# Patient Record
Sex: FEMALE | Race: WHITE | NOT HISPANIC OR LATINO | Employment: FULL TIME | URBAN - METROPOLITAN AREA
[De-identification: names, ages, dates, MRNs, and addresses within clinical notes are randomized per-mention and may not be internally consistent; named-entity substitution may affect disease eponyms.]

---

## 2022-10-03 ENCOUNTER — HOSPITAL ENCOUNTER (OUTPATIENT)
Facility: HOSPITAL | Age: 37
Discharge: HOME | DRG: 520 | End: 2022-10-06
Attending: STUDENT IN AN ORGANIZED HEALTH CARE EDUCATION/TRAINING PROGRAM | Admitting: INTERNAL MEDICINE
Payer: COMMERCIAL

## 2022-10-03 DIAGNOSIS — Z01.818 PREOP TESTING: ICD-10-CM

## 2022-10-03 DIAGNOSIS — M54.50 LUMBAR PAIN: ICD-10-CM

## 2022-10-03 DIAGNOSIS — M54.32 SCIATICA OF LEFT SIDE: Primary | ICD-10-CM

## 2022-10-03 DIAGNOSIS — R52 INTRACTABLE PAIN: ICD-10-CM

## 2022-10-03 DIAGNOSIS — M51.26 LUMBAR DISC HERNIATION: ICD-10-CM

## 2022-10-03 DIAGNOSIS — N83.209 CYST OF OVARY, UNSPECIFIED LATERALITY: ICD-10-CM

## 2022-10-03 PROCEDURE — 99285 EMERGENCY DEPT VISIT HI MDM: CPT | Mod: 25

## 2022-10-03 PROCEDURE — 96376 TX/PRO/DX INJ SAME DRUG ADON: CPT

## 2022-10-03 PROCEDURE — 96374 THER/PROPH/DIAG INJ IV PUSH: CPT

## 2022-10-03 PROCEDURE — 96375 TX/PRO/DX INJ NEW DRUG ADDON: CPT

## 2022-10-03 NOTE — Clinical Note
Diagnosis: Intractable pain [509763]   Admitting Provider:: СВЕТЛАНА BETH [[id number]]   Future Attending Provider: СВЕТЛАНА BETH [[id number]]   Reason for IP Medical Treatment  (Clinical interventions that can only be accomplished in the IP setting? ) :: Intractable Lumbar pain, hx of steroid injections, sciatica,   Estimated Length of Stay:: 2 midnights   I certify that Inpatient services for greater than or equal to 2 midnights are medically necessary:: Yes   Plans for Post-Acute care--if anticipated (pick the single best option):: A. No post acute care anticipated at this time

## 2022-10-04 LAB
ALBUMIN SERPL-MCNC: 4.1 GM/DL (ref 3.5–5)
ALBUMIN/GLOB SERPL: 1.4 RATIO (ref 1.1–2)
ALP SERPL-CCNC: 46 UNIT/L (ref 40–150)
ALT SERPL-CCNC: 13 UNIT/L (ref 0–55)
APTT PPP: 27.3 SECONDS (ref 23.2–33.7)
AST SERPL-CCNC: 15 UNIT/L (ref 5–34)
BASOPHILS # BLD AUTO: 0.05 X10(3)/MCL (ref 0–0.2)
BASOPHILS NFR BLD AUTO: 0.5 %
BILIRUBIN DIRECT+TOT PNL SERPL-MCNC: 0.3 MG/DL
BUN SERPL-MCNC: 15 MG/DL (ref 7–18.7)
CALCIUM SERPL-MCNC: 9.5 MG/DL (ref 8.4–10.2)
CHLORIDE SERPL-SCNC: 108 MMOL/L (ref 98–107)
CO2 SERPL-SCNC: 26 MMOL/L (ref 22–29)
CREAT SERPL-MCNC: 0.7 MG/DL (ref 0.55–1.02)
EOSINOPHIL # BLD AUTO: 0.2 X10(3)/MCL (ref 0–0.9)
EOSINOPHIL NFR BLD AUTO: 2 %
ERYTHROCYTE [DISTWIDTH] IN BLOOD BY AUTOMATED COUNT: 14.2 % (ref 11.5–17)
GFR SERPLBLD CREATININE-BSD FMLA CKD-EPI: >60 MLS/MIN/1.73/M2
GLOBULIN SER-MCNC: 2.9 GM/DL (ref 2.4–3.5)
GLUCOSE SERPL-MCNC: 67 MG/DL (ref 74–100)
GROUP & RH: NORMAL
HCT VFR BLD AUTO: 37.8 % (ref 37–47)
HGB BLD-MCNC: 12 GM/DL (ref 12–16)
IMM GRANULOCYTES # BLD AUTO: 0.02 X10(3)/MCL (ref 0–0.04)
IMM GRANULOCYTES NFR BLD AUTO: 0.2 %
INDIRECT COOMBS GEL: NORMAL
INR BLD: 0.97 (ref 0–1.3)
LYMPHOCYTES # BLD AUTO: 3.36 X10(3)/MCL (ref 0.6–4.6)
LYMPHOCYTES NFR BLD AUTO: 34.2 %
MCH RBC QN AUTO: 27.3 PG (ref 27–31)
MCHC RBC AUTO-ENTMCNC: 31.7 MG/DL (ref 33–36)
MCV RBC AUTO: 85.9 FL (ref 80–94)
MONOCYTES # BLD AUTO: 0.94 X10(3)/MCL (ref 0.1–1.3)
MONOCYTES NFR BLD AUTO: 9.6 %
NEUTROPHILS # BLD AUTO: 5.3 X10(3)/MCL (ref 2.1–9.2)
NEUTROPHILS NFR BLD AUTO: 53.5 %
NRBC BLD AUTO-RTO: 0 %
PLATELET # BLD AUTO: 350 X10(3)/MCL (ref 130–400)
PMV BLD AUTO: 10.4 FL (ref 7.4–10.4)
POTASSIUM SERPL-SCNC: 3.8 MMOL/L (ref 3.5–5.1)
PROT SERPL-MCNC: 7 GM/DL (ref 6.4–8.3)
PROTHROMBIN TIME: 12.8 SECONDS (ref 12.5–14.5)
RBC # BLD AUTO: 4.4 X10(6)/MCL (ref 4.2–5.4)
SODIUM SERPL-SCNC: 139 MMOL/L (ref 136–145)
WBC # SPEC AUTO: 9.8 X10(3)/MCL (ref 4.5–11.5)

## 2022-10-04 PROCEDURE — 21400001 HC TELEMETRY ROOM

## 2022-10-04 PROCEDURE — 85730 THROMBOPLASTIN TIME PARTIAL: CPT | Performed by: NURSE PRACTITIONER

## 2022-10-04 PROCEDURE — 36415 COLL VENOUS BLD VENIPUNCTURE: CPT | Performed by: NURSE PRACTITIONER

## 2022-10-04 PROCEDURE — 25000003 PHARM REV CODE 250: Performed by: INTERNAL MEDICINE

## 2022-10-04 PROCEDURE — G0378 HOSPITAL OBSERVATION PER HR: HCPCS

## 2022-10-04 PROCEDURE — 85025 COMPLETE CBC W/AUTO DIFF WBC: CPT | Performed by: STUDENT IN AN ORGANIZED HEALTH CARE EDUCATION/TRAINING PROGRAM

## 2022-10-04 PROCEDURE — 63600175 PHARM REV CODE 636 W HCPCS: Performed by: INTERNAL MEDICINE

## 2022-10-04 PROCEDURE — 96376 TX/PRO/DX INJ SAME DRUG ADON: CPT

## 2022-10-04 PROCEDURE — 25500020 PHARM REV CODE 255: Performed by: STUDENT IN AN ORGANIZED HEALTH CARE EDUCATION/TRAINING PROGRAM

## 2022-10-04 PROCEDURE — 63600175 PHARM REV CODE 636 W HCPCS

## 2022-10-04 PROCEDURE — 87635 SARS-COV-2 COVID-19 AMP PRB: CPT | Performed by: NEUROLOGICAL SURGERY

## 2022-10-04 PROCEDURE — 85610 PROTHROMBIN TIME: CPT | Performed by: NURSE PRACTITIONER

## 2022-10-04 PROCEDURE — 63600175 PHARM REV CODE 636 W HCPCS: Performed by: STUDENT IN AN ORGANIZED HEALTH CARE EDUCATION/TRAINING PROGRAM

## 2022-10-04 PROCEDURE — 80053 COMPREHEN METABOLIC PANEL: CPT | Performed by: STUDENT IN AN ORGANIZED HEALTH CARE EDUCATION/TRAINING PROGRAM

## 2022-10-04 PROCEDURE — 86900 BLOOD TYPING SEROLOGIC ABO: CPT

## 2022-10-04 PROCEDURE — 36415 COLL VENOUS BLD VENIPUNCTURE: CPT | Performed by: STUDENT IN AN ORGANIZED HEALTH CARE EDUCATION/TRAINING PROGRAM

## 2022-10-04 PROCEDURE — 96375 TX/PRO/DX INJ NEW DRUG ADDON: CPT

## 2022-10-04 PROCEDURE — 96374 THER/PROPH/DIAG INJ IV PUSH: CPT

## 2022-10-04 RX ORDER — KETOROLAC TROMETHAMINE 30 MG/ML
30 INJECTION, SOLUTION INTRAMUSCULAR; INTRAVENOUS ONCE
Status: COMPLETED | OUTPATIENT
Start: 2022-10-04 | End: 2022-10-04

## 2022-10-04 RX ORDER — HYDROMORPHONE HYDROCHLORIDE 2 MG/ML
1 INJECTION, SOLUTION INTRAMUSCULAR; INTRAVENOUS; SUBCUTANEOUS
Status: COMPLETED | OUTPATIENT
Start: 2022-10-04 | End: 2022-10-04

## 2022-10-04 RX ORDER — GLUCAGON 1 MG
1 KIT INJECTION
Status: DISCONTINUED | OUTPATIENT
Start: 2022-10-04 | End: 2022-10-06 | Stop reason: HOSPADM

## 2022-10-04 RX ORDER — MORPHINE SULFATE 4 MG/ML
4 INJECTION, SOLUTION INTRAMUSCULAR; INTRAVENOUS
Status: COMPLETED | OUTPATIENT
Start: 2022-10-04 | End: 2022-10-04

## 2022-10-04 RX ORDER — MORPHINE SULFATE 4 MG/ML
4 INJECTION, SOLUTION INTRAMUSCULAR; INTRAVENOUS
Status: DISCONTINUED | OUTPATIENT
Start: 2022-10-04 | End: 2022-10-04

## 2022-10-04 RX ORDER — BUPROPION HYDROCHLORIDE 150 MG/1
150 TABLET ORAL DAILY
COMMUNITY
Start: 2022-09-26

## 2022-10-04 RX ORDER — LIDOCAINE 50 MG/G
1 PATCH TOPICAL
Status: DISCONTINUED | OUTPATIENT
Start: 2022-10-04 | End: 2022-10-06 | Stop reason: HOSPADM

## 2022-10-04 RX ORDER — IBUPROFEN 200 MG
16 TABLET ORAL
Status: DISCONTINUED | OUTPATIENT
Start: 2022-10-04 | End: 2022-10-06 | Stop reason: HOSPADM

## 2022-10-04 RX ORDER — FAMOTIDINE 20 MG/1
20 TABLET, FILM COATED ORAL 2 TIMES DAILY
Status: DISCONTINUED | OUTPATIENT
Start: 2022-10-04 | End: 2022-10-06 | Stop reason: HOSPADM

## 2022-10-04 RX ORDER — OXYCODONE AND ACETAMINOPHEN 10; 325 MG/1; MG/1
1 TABLET ORAL EVERY 4 HOURS PRN
Status: DISCONTINUED | OUTPATIENT
Start: 2022-10-04 | End: 2022-10-06 | Stop reason: HOSPADM

## 2022-10-04 RX ORDER — GABAPENTIN 300 MG/1
300 CAPSULE ORAL 3 TIMES DAILY
Status: DISCONTINUED | OUTPATIENT
Start: 2022-10-04 | End: 2022-10-06 | Stop reason: HOSPADM

## 2022-10-04 RX ORDER — CYCLOBENZAPRINE HCL 10 MG
10 TABLET ORAL 3 TIMES DAILY
Status: DISCONTINUED | OUTPATIENT
Start: 2022-10-04 | End: 2022-10-06 | Stop reason: HOSPADM

## 2022-10-04 RX ORDER — ONDANSETRON 2 MG/ML
4 INJECTION INTRAMUSCULAR; INTRAVENOUS EVERY 8 HOURS PRN
Status: DISCONTINUED | OUTPATIENT
Start: 2022-10-04 | End: 2022-10-06 | Stop reason: HOSPADM

## 2022-10-04 RX ORDER — DROSPIRENONE 4 MG/1
1 TABLET, FILM COATED ORAL DAILY
COMMUNITY
Start: 2022-07-29

## 2022-10-04 RX ORDER — ONDANSETRON 2 MG/ML
INJECTION INTRAMUSCULAR; INTRAVENOUS
Status: COMPLETED
Start: 2022-10-04 | End: 2022-10-04

## 2022-10-04 RX ORDER — ONDANSETRON 2 MG/ML
4 INJECTION INTRAMUSCULAR; INTRAVENOUS
Status: COMPLETED | OUTPATIENT
Start: 2022-10-04 | End: 2022-10-04

## 2022-10-04 RX ORDER — DROPERIDOL 2.5 MG/ML
0.62 INJECTION, SOLUTION INTRAMUSCULAR; INTRAVENOUS
Status: COMPLETED | OUTPATIENT
Start: 2022-10-04 | End: 2022-10-04

## 2022-10-04 RX ORDER — CYCLOBENZAPRINE HCL 10 MG
10 TABLET ORAL NIGHTLY PRN
COMMUNITY
Start: 2022-10-03

## 2022-10-04 RX ORDER — HYDROMORPHONE HYDROCHLORIDE 2 MG/ML
1 INJECTION, SOLUTION INTRAMUSCULAR; INTRAVENOUS; SUBCUTANEOUS EVERY 4 HOURS PRN
Status: DISCONTINUED | OUTPATIENT
Start: 2022-10-04 | End: 2022-10-06 | Stop reason: HOSPADM

## 2022-10-04 RX ORDER — MORPHINE SULFATE 4 MG/ML
INJECTION, SOLUTION INTRAMUSCULAR; INTRAVENOUS
Status: COMPLETED
Start: 2022-10-04 | End: 2022-10-04

## 2022-10-04 RX ORDER — DEXAMETHASONE SODIUM PHOSPHATE 4 MG/ML
12 INJECTION, SOLUTION INTRA-ARTICULAR; INTRALESIONAL; INTRAMUSCULAR; INTRAVENOUS; SOFT TISSUE ONCE
Status: COMPLETED | OUTPATIENT
Start: 2022-10-04 | End: 2022-10-04

## 2022-10-04 RX ORDER — PANTOPRAZOLE SODIUM 40 MG/1
40 TABLET, DELAYED RELEASE ORAL DAILY
COMMUNITY
Start: 2022-09-26

## 2022-10-04 RX ORDER — KETOROLAC TROMETHAMINE 30 MG/ML
15 INJECTION, SOLUTION INTRAMUSCULAR; INTRAVENOUS EVERY 6 HOURS PRN
Status: DISCONTINUED | OUTPATIENT
Start: 2022-10-04 | End: 2022-10-06 | Stop reason: HOSPADM

## 2022-10-04 RX ORDER — ACETAMINOPHEN 325 MG/1
650 TABLET ORAL EVERY 4 HOURS PRN
Status: DISCONTINUED | OUTPATIENT
Start: 2022-10-04 | End: 2022-10-06 | Stop reason: HOSPADM

## 2022-10-04 RX ORDER — HYDROXYCHLOROQUINE SULFATE 200 MG/1
400 TABLET, FILM COATED ORAL DAILY
COMMUNITY
Start: 2022-09-24

## 2022-10-04 RX ORDER — ACETAMINOPHEN 325 MG/1
650 TABLET ORAL EVERY 8 HOURS PRN
Status: DISCONTINUED | OUTPATIENT
Start: 2022-10-04 | End: 2022-10-06 | Stop reason: HOSPADM

## 2022-10-04 RX ORDER — IBUPROFEN 200 MG
24 TABLET ORAL
Status: DISCONTINUED | OUTPATIENT
Start: 2022-10-04 | End: 2022-10-06 | Stop reason: HOSPADM

## 2022-10-04 RX ADMIN — MORPHINE SULFATE 4 MG: 4 INJECTION INTRAVENOUS at 02:10

## 2022-10-04 RX ADMIN — HYDROMORPHONE HYDROCHLORIDE 1 MG: 2 INJECTION, SOLUTION INTRAMUSCULAR; INTRAVENOUS; SUBCUTANEOUS at 06:10

## 2022-10-04 RX ADMIN — KETOROLAC TROMETHAMINE 30 MG: 30 INJECTION, SOLUTION INTRAMUSCULAR; INTRAVENOUS at 11:10

## 2022-10-04 RX ADMIN — CYCLOBENZAPRINE 10 MG: 10 TABLET, FILM COATED ORAL at 11:10

## 2022-10-04 RX ADMIN — GABAPENTIN 300 MG: 300 CAPSULE ORAL at 08:10

## 2022-10-04 RX ADMIN — LIDOCAINE 1 PATCH: 50 PATCH TOPICAL at 02:10

## 2022-10-04 RX ADMIN — GABAPENTIN 300 MG: 300 CAPSULE ORAL at 02:10

## 2022-10-04 RX ADMIN — ONDANSETRON 4 MG: 2 INJECTION INTRAMUSCULAR; INTRAVENOUS at 02:10

## 2022-10-04 RX ADMIN — HYDROMORPHONE HYDROCHLORIDE 1 MG: 2 INJECTION, SOLUTION INTRAMUSCULAR; INTRAVENOUS; SUBCUTANEOUS at 03:10

## 2022-10-04 RX ADMIN — OXYCODONE AND ACETAMINOPHEN 1 TABLET: 10; 325 TABLET ORAL at 07:10

## 2022-10-04 RX ADMIN — ONDANSETRON 4 MG: 2 INJECTION INTRAMUSCULAR; INTRAVENOUS at 03:10

## 2022-10-04 RX ADMIN — DEXAMETHASONE SODIUM PHOSPHATE 12 MG: 4 INJECTION, SOLUTION INTRA-ARTICULAR; INTRALESIONAL; INTRAMUSCULAR; INTRAVENOUS; SOFT TISSUE at 11:10

## 2022-10-04 RX ADMIN — DROPERIDOL 0.62 MG: 2.5 INJECTION, SOLUTION INTRAMUSCULAR; INTRAVENOUS at 04:10

## 2022-10-04 RX ADMIN — HYDROMORPHONE HYDROCHLORIDE 1 MG: 2 INJECTION INTRAMUSCULAR; INTRAVENOUS; SUBCUTANEOUS at 08:10

## 2022-10-04 RX ADMIN — HYDROMORPHONE HYDROCHLORIDE 1 MG: 2 INJECTION INTRAMUSCULAR; INTRAVENOUS; SUBCUTANEOUS at 03:10

## 2022-10-04 RX ADMIN — FAMOTIDINE 20 MG: 20 TABLET, FILM COATED ORAL at 11:10

## 2022-10-04 RX ADMIN — MORPHINE SULFATE 4 MG: 4 INJECTION, SOLUTION INTRAMUSCULAR; INTRAVENOUS at 02:10

## 2022-10-04 RX ADMIN — OXYCODONE AND ACETAMINOPHEN 1 TABLET: 10; 325 TABLET ORAL at 02:10

## 2022-10-04 RX ADMIN — KETOROLAC TROMETHAMINE 15 MG: 30 INJECTION, SOLUTION INTRAMUSCULAR at 10:10

## 2022-10-04 RX ADMIN — FAMOTIDINE 20 MG: 20 TABLET, FILM COATED ORAL at 08:10

## 2022-10-04 RX ADMIN — HYDROMORPHONE HYDROCHLORIDE 1 MG: 2 INJECTION INTRAMUSCULAR; INTRAVENOUS; SUBCUTANEOUS at 11:10

## 2022-10-04 RX ADMIN — CYCLOBENZAPRINE 10 MG: 10 TABLET, FILM COATED ORAL at 02:10

## 2022-10-04 RX ADMIN — IOPAMIDOL 100 ML: 755 INJECTION, SOLUTION INTRAVENOUS at 04:10

## 2022-10-04 RX ADMIN — CYCLOBENZAPRINE 10 MG: 10 TABLET, FILM COATED ORAL at 08:10

## 2022-10-04 NOTE — H&P
[facility]   H&P ADMISSION NOTE      Patient Name: Vonnie Batista  MRN: [id number]  Patient Class: IP- Inpatient   Admission Date: 10/3/2022   Admitting Physician:  Service   Attending Physician: Miko Ozuna MD  Primary Care Provider: No primary care provider on file.  Face-to-Face encounter date: 10/04/2022        CHIEF COMPLAINT     Chief Complaint   Patient presents with    Leg Pain     R leg pain since Saturday, hx sciatic pain same area w/ steroid injection 2 weeks ago, norco not helping pain, scheduled for MRI - pt of dr ledezma       HISTORY OF PRESENTING ILLNESS   36-year-old female with a past medical history of chronic lower back pain.  She presented to the emergency department today with complaints of intractable lower back pain and left leg heaviness.  She states that it is radiating from the left posterior buttock all the way down to the toes, denies any loss of sensation or weakness, no urinary or bowel incontinence.  He she stated that she has had back pain for many years but recently worsened and she received a lumbar epidural steroid injection recently which provided her about 2 weeks of relief but pain returned again on Saturday 4 days ago.  Neurosurgery has been consulted and we have been asked to admit the patient for further workup.        PAST MEDICAL HISTORY   History reviewed. No pertinent past medical history.  Chronic lower back pain    PAST SURGICAL HISTORY   History reviewed. No pertinent surgical history.  LESI    FAMILY HISTORY   Reviewed and noncontributory to this case    SOCIAL HISTORY     Social History     Socioeconomic History    Marital status:        HOME MEDICATIONS     Prior to Admission medications    Not on File       ALLERGIES   Patient has no known allergies.          REVIEW OF SYSTEMS   Except as documented above, all other systems reviewed and negative    PHYSICAL EXAM     Vitals:    10/04/22 1107   BP:    Pulse:     Resp: 20   Temp:       General:  In no acute distress, resting comfortably  Head and neck:  Atraumatic, normocephalic, moist mucous membranes, supple neck  Chest:  Clear to auscultation bilaterally  Heart:  S1, S2, no appreciable murmur  Abdomen:  Soft, nontender, BS +  MSK:  Warm, no lower extremity edema, no clubbing or cyanosis  Neuro:  Alert and oriented x4, moving all extremities with good strength, weak dorsiflexion on the left  Integumentary:  No obvious skin rash  Psychiatry:  Appropriate mood and affect          ASSESSMENT AND PLAN   Intractable lower back pain     History of: Chronic lower back pain/sciatica      Neurosurgery consulted.    Pending MRI lumbar spine  Pain control.  Give 1 dose IV dexamethasone, Toradol and oral cyclobenzaprine 10 t.i.d., gabapentin 300 t.i.d..  PT and OT    DVT prophylaxis:    __________________________________________________________________  LABS/MICRO/MEDS/DIAGNOSTICS       LABS  Recent Labs     10/04/22  0300      K 3.8   CHLORIDE 108*   CO2 26   BUN 15.0   CREATININE 0.70   GLUCOSE 67*   CALCIUM 9.5   ALKPHOS 46   AST 15   ALT 13   ALBUMIN 4.1     Recent Labs     10/04/22  0300   WBC 9.8   RBC 4.40   HCT 37.8   MCV 85.9          MICROBIOLOGY  Microbiology Results (last 7 days)       ** No results found for the last 168 hours. **            MEDICATIONS   cyclobenzaprine  10 mg Oral TID    famotidine  20 mg Oral BID    gabapentin  300 mg Oral TID      INFUSIONS      DIAGNOSTIC TESTS  CT Abdomen Pelvis With Contrast   Final Result      1. No acute abdominopelvic findings.   2. Possible disc protrusion or extrusion L4-L5.   3.  No significant discrepancy with the preliminary report.         Electronically signed by: Nishant Flores   Date:    10/04/2022   Time:    07:31      MRI Lumbar Spine Without Contrast    (Results Pending)          Patient information was obtained from patient, patient's family, past medical records and ER records.   All diagnosis and  differential diagnosis have been reviewed; assessment and plan has been documented. I have personally reviewed the labs and test results that are presently available; I have reviewed the patients medication list. I have reviewed the consulting providers response and recommendations. I have reviewed or attempted to review medical records based upon their availability.  All of the patient's questions have been addressed and answered. Patient's is agreeable to the above stated plan. I will continue to monitor closely and make adjustments to medical management as needed.  This document was created using M*Modal Fluency Direct.  Transcription errors may have been made.  Please contact me if any questions may rise regarding documentation to clarify verbiage.        Miko Ozuna MD   10/04/2022   Internal Medicine

## 2022-10-04 NOTE — CONSULTS
[facility] - Emergency Dept  Neurosurgery  Consult Note    Consults  Subjective:     Chief Complaint/Reason for Admission:  Intractable lower extremity pain on the left.    History of Present Illness:  This is a pleasant 36-year-old  female with past medical history significant for degenerative disc disease, on Plaquenil for autoimmune disease, recent steroid injection LESI 2 weeks ago, history of sciatic pain presents with intractable pain, tingling, numbness of left lower extremity.  Denies any incontinence or bowel issues.  She does report left leg weakness.  She was scheduled for an MRI on Wednesday and possible surgical evaluation with Dr. Grace to follow.    On exam she is pleasant oriented to all spheres, moves all extremities.  Endorses left lower extremity weakness.  Numbness tingling burning, extreme pain to left lower extremity that starts at the hip and goes down to her left ankle which she says is throbbing.  Upper extremities 5/5.  Right lower extremity 5/5.  Left lower extremity dorsiflex 1/5.  Plantar flexor 4/5.  Positive SLR on the left lower extremity.  She stated she was still able to ambulate but has been having this drop foot for awhile.  Unknown time frame.    CT performed with:  Broad-based left central and subarticular disc bulge, possibly protrusion at L4-L5.  MRI recommended.      Patient is being sedated and will have MRI lumbar spine soon.    (Not in a hospital admission)      Review of patient's allergies indicates:  No Known Allergies    History reviewed. No pertinent past medical history.  History reviewed. No pertinent surgical history.  Family History    None       Tobacco Use    Smoking status: Not on file    Smokeless tobacco: Not on file   Substance and Sexual Activity    Alcohol use: Not on file    Drug use: Not on file    Sexual activity: Not on file     Review of Systems   Musculoskeletal:         Extreme left lower leg pain, numbness, tingling,  "burning all the way down to the ankle which is throbbing.   Objective:        There is no height or weight on file to calculate BMI.  Vital Signs (Most Recent):  Temp: 98.2 °F (36.8 °C) (10/03/22 2052)  Pulse: 74 (10/04/22 0941)  Resp: 20 (10/04/22 1107)  BP: (!) 144/83 (10/04/22 0941)  SpO2: 99 % (10/04/22 0941)   Vital Signs (24h Range):  Temp:  [98.2 °F (36.8 °C)] 98.2 °F (36.8 °C)  Pulse:  [64-83] 74  Resp:  [16-20] 20  SpO2:  [94 %-100 %] 99 %  BP: (108-144)/(64-83) 144/83                          Physical Exam:  Nursing note and vitals reviewed.    Constitutional: She appears well-developed and well-nourished.     Eyes: Pupils are equal, round, and reactive to light.     Cardiovascular: Normal rate, regular rhythm, normal pulses and intact distal pulses.     Abdominal: Soft. Bowel sounds are normal.     Skin: Skin displays no rash on trunk and no rash on extremities. Skin displays no lesions on trunk and no lesions on extremities.     Psych/Behavior: She is alert. She is oriented to person, place, and time. She has a normal mood and affect.     Musculoskeletal:      Comments: On exam she is pleasant oriented to all spheres, moves all extremities.  Endorses left lower extremity weakness.  Numbness tingling burning, extreme pain to left lower extremity that starts at the hip and goes down to her left ankle which she says is throbbing.  Upper extremities 5/5.  Right lower extremity 5/5.  Left lower extremity dorsiflex 1/5.  Plantar flexor 4/5.  Positive SLR on the left lower extremity.  She stated she was still able to ambulate but has been having this "drop foot" for awhile.  Unknown time frame.     Neurological:        Sensory: There is no sensory deficit in the trunk. There is sensory deficit in the extremities.   LEs sensation left lower extremity       DTRs: DTRs are DTRS NORMAL AND SYMMETRICnormal and symmetric.        Cranial nerves: Cranial nerve(s) II, III, IV, V, VI, VII, VIII, IX, X, XI and XII are " intact.     Significant Labs:  Recent Labs   Lab 10/04/22  0300      K 3.8   CO2 26   BUN 15.0   CREATININE 0.70   CALCIUM 9.5     Recent Labs   Lab 10/04/22  0300   WBC 9.8   HGB 12.0   HCT 37.8        No results for input(s): LABPT, INR, APTT in the last 48 hours.  Microbiology Results (last 7 days)       ** No results found for the last 168 hours. **            Significant Diagnostics:      Assessment/Plan:    Stat MRI lumbar spine   Pain control   Fall precautions   Frequent motor exams   SCDs       Further recommendations to follow        Addendum:  Spoke with Dr. Grace.  He has reviewed the MRI.  Patient will be scheduled for L4-5 MIS diskectomy tomorrow.  NPO after midnight.  Will place consent on chart.  Will discuss with patient.       There are no hospital problems to display for this patient.      Thank you for your consult.     Mary Anne Glass [facility]  Neurosurgery  [facility] - Emergency Dept

## 2022-10-04 NOTE — ED PROVIDER NOTES
Encounter Date: 10/3/2022    SCRIBE #1 NOTE: IBrett, am scribing for, and in the presence of,  Marcelino Ozuna MD. I have scribed the following portions of the note - Other sections scribed: HPI, ROS, PE.     History     Chief Complaint   Patient presents with    Leg Pain     R leg pain since Saturday, hx sciatic pain same area w/ steroid injection 2 weeks ago, norco not helping pain, scheduled for MRI - pt of dr grace     36 year old female with past medical history of degenerative disk disease presents to ED c/o left leg pain onset saturday. Pt reports that she has a history of sciatic nerve pain. Pt reports that pain feels like numbness and tingling in her left leg. Pt reports that she received steroid injections for chronic back pain 2 weeks ago. Pt denies fever and incontinence.  Reports left leg weakness.  Scheduled for MRI on Wednesday.  Sees Dr. Grace.     The history is provided by the patient. No  was used.   Leg Pain   There was no injury mechanism. The incident occurred several days ago. The pain is present in the left leg. The quality of the pain is described as tingling. The pain has been Constant since onset. Associated symptoms include numbness and tingling. She reports no foreign bodies present. Nothing aggravates the symptoms. Treatments tried: norco, steriods. The treatment provided no relief.         Review of patient's allergies indicates:  No Known Allergies  History reviewed. No pertinent past medical history.  History reviewed. No pertinent surgical history.  History reviewed. No pertinent family history.     Review of Systems   Constitutional:  Negative for fever.   HENT:  Negative for sore throat.    Eyes:  Negative for visual disturbance.   Respiratory:  Negative for shortness of breath.    Cardiovascular:  Negative for chest pain.   Gastrointestinal:  Negative for abdominal pain.   Genitourinary:  Negative for dysuria.   Musculoskeletal:  Positive for  back pain. Negative for joint swelling.        Leg pain   Skin:  Negative for rash.   Neurological:  Positive for tingling and numbness. Negative for weakness.   Psychiatric/Behavioral:  Negative for confusion.      Physical Exam     Initial Vitals [10/03/22 2052]   BP Pulse Resp Temp SpO2   108/64 72 16 98.2 °F (36.8 °C) 99 %      MAP       --         Physical Exam    Nursing note and vitals reviewed.  Constitutional: She appears well-developed and well-nourished. She appears distressed.   HENT:   Head: Normocephalic and atraumatic.   Eyes: EOM are normal. Pupils are equal, round, and reactive to light.   Neck:   Normal range of motion.  Cardiovascular:  Normal rate, regular rhythm, normal heart sounds and intact distal pulses.           No murmur heard.  Pulmonary/Chest: Breath sounds normal. No respiratory distress. She has no wheezes. She has no rales.   Abdominal: Abdomen is soft. She exhibits no distension. There is no abdominal tenderness. There is no rebound.   Musculoskeletal:         General: No tenderness or edema. Normal range of motion.      Cervical back: Normal range of motion.     Neurological: She is alert. She has normal strength. No cranial nerve deficit. GCS score is 15. GCS eye subscore is 4. GCS verbal subscore is 5. GCS motor subscore is 6.   Positive SLR on left.   5/5 strength bilaterally when pain controlled.    Subjective numbness to the left lateral leg, numbness of left toes.    Skin: Skin is warm and dry. Capillary refill takes less than 2 seconds. No rash noted. No erythema.   Psychiatric: She has a normal mood and affect.       ED Course   Procedures  Labs Reviewed   COMPREHENSIVE METABOLIC PANEL - Abnormal; Notable for the following components:       Result Value    Chloride 108 (*)     Glucose Level 67 (*)     All other components within normal limits   CBC WITH DIFFERENTIAL - Abnormal; Notable for the following components:    MCHC 31.7 (*)     All other components within normal  limits   CBC W/ AUTO DIFFERENTIAL    Narrative:     The following orders were created for panel order CBC auto differential.  Procedure                               Abnormality         Status                     ---------                               -----------         ------                     CBC with Differential[[id number]]        Abnormal            Final result                 Please view results for these tests on the individual orders.   PREGNANCY TEST, URINE RAPID   URINALYSIS, REFLEX TO URINE CULTURE          Imaging Results              CT Abdomen Pelvis With Contrast (Final result)  Result time 10/04/22 07:31:55      Final result by Nishant Flores MD (10/04/22 07:31:55)                   Impression:      1. No acute abdominopelvic findings.  2. Possible disc protrusion or extrusion L4-L5.  3.  No significant discrepancy with the preliminary report.      Electronically signed by: Nishant Flores  Date:    10/04/2022  Time:    07:31               Narrative:    EXAMINATION:  CT ABDOMEN PELVIS WITH CONTRAST    CLINICAL HISTORY:  Back pain, Radicular down L leg;    TECHNIQUE:  Helical acquisition through the abdomen and pelvis without IV contrast.  Lack of contrast limits evaluation of solid organs and vascular structures .  Three plane reconstructions were provided for review.  mGycm. Automatic exposure control, adjustment of mA/kV or iterative reconstruction technique was used to reduce radiation.    COMPARISON:  No prior CT    FINDINGS:  Mild atelectasis or scarring at the lung bases.  Mild wall thickening of the distal esophagus.    No significant abnormality liver, spleen, pancreas or adrenals.  Gallbladder surgically absent.  No hydronephrosis or suspicious renal lesion.    No bowel obstruction.  There is stool throughout the colon.  There are changes of gastric sleeve.  No free air.    Urinary bladder unremarkable.  No pelvic free fluid.  Adnexa within normal limits for age.  Abdominal aorta  normal in caliber.    Mild degenerative change of the spine, question disc protrusion or extrusion at L4-L5.                        Preliminary result by Nishant Flores MD (10/04/22 04:36:49)                   Narrative:    START OF REPORT:  Technique: CT of the abdomen and pelvis was performed with axial images as well as sagittal and coronal reconstruction images with intravenous contrast.    Comparison: None available.    Clinical History: Back pain radiating down left leg.    Dosage Information: Automated Exposure Control was utilized.    Findings:  Thorax:  Lungs: Subpleural band like opacities are seen in bilateral lower lobes, which may reflect parenchymal scarring versus subsegmental atelectasis.  Pleura: No effusions or thickening.  Heart: The heart size is within normal limits.  Abdomen:  Abdominal Wall: No abdominal wall pathology is seen.  Liver: The liver appears unremarkable.  Biliary System: No intrahepatic or extrahepatic biliary duct dilatation is seen.  Gallbladder: Surgical clips are seen in the gallbladder fossa consistent with prior cholecystectomy.  Pancreas: The pancreas appears unremarkable.  Spleen: The spleen appears unremarkable.  Adrenals: The adrenal glands appear unremarkable.  Kidneys: The kidneys appear unremarkable with no stones cysts masses or hydronephrosis.  Aorta: The abdominal aorta appears unremarkable.  IVC: Unremarkable.  Bowel:  Esophagus: There is mild wall thickening of the distal esophagus, which may reflect reflux esophagitis. There is a small hiatal hernia.  Stomach: Postoperative changes are identified in the stomach, consistent with gastric sleeve.  Duodenum: Unremarkable appearing duodenum.  Small Bowel: The small bowel appears unremarkable.  Colon: There is moderate stool in the cecum ascending transverse and descending colon which could reflect an element of constipation.  Appendix: No appendix is identified.  Peritoneum: No intraperitoneal free air or ascites is  seen.    Pelvis:  Bladder: The bladder appears unremarkable.  Female:  Uterus: The uterus appears unremarkable. A vaginal tampon is seen.  Ovaries: There is a 2.5 cm right ovarian cyst (series 2, image 85).    Bony structures: There is a broad-based left central and subarticular disc bulge, possibly protrusion at L4-L5. There is mild-to-moderate bilateral facet hypertrophy. There is moderate left subarticular recess narrowing with likely descending intraspinal left L5 nerve root impingement. There is moderate bilateral neural foraminal narrowing with likely exiting bilateral 4 nerve root impingement. Correlate clinically as regards further evaluation and followup with MRI.  Dorsal Spine: There is mild spondylosis of the visualized dorsal spine.      Impression:  1. There is moderate stool in the cecum ascending transverse and descending colon which could reflect an element of constipation.  2. There is a 2.5 cm right ovarian cyst (series 2, image 85).  3. There is mild wall thickening of the distal esophagus, which may reflect reflux esophagitis. There is a small hiatal hernia.  4. There is a broad-based left central and subarticular disc bulge, possibly protrusion at L4-L5. Correlate clinically as regards further evaluation and followup with MRI.                          Preliminary result by Jordon Tobias Jr., MD (10/04/22 04:36:49)                   Narrative:    START OF REPORT:  Technique: CT of the abdomen and pelvis was performed with axial images as well as sagittal and coronal reconstruction images with intravenous contrast.    Comparison: None available.    Clinical History: Back pain radiating down left leg.    Dosage Information: Automated Exposure Control was utilized.    Findings:  Thorax:  Lungs: Subpleural band like opacities are seen in bilateral lower lobes, which may reflect parenchymal scarring versus subsegmental atelectasis.  Pleura: No effusions or thickening.  Heart: The heart size is  within normal limits.  Abdomen:  Abdominal Wall: No abdominal wall pathology is seen.  Liver: The liver appears unremarkable.  Biliary System: No intrahepatic or extrahepatic biliary duct dilatation is seen.  Gallbladder: Surgical clips are seen in the gallbladder fossa consistent with prior cholecystectomy.  Pancreas: The pancreas appears unremarkable.  Spleen: The spleen appears unremarkable.  Adrenals: The adrenal glands appear unremarkable.  Kidneys: The kidneys appear unremarkable with no stones cysts masses or hydronephrosis.  Aorta: The abdominal aorta appears unremarkable.  IVC: Unremarkable.  Bowel:  Esophagus: There is mild wall thickening of the distal esophagus, which may reflect reflux esophagitis. There is a small hiatal hernia.  Stomach: Postoperative changes are identified in the stomach, consistent with gastric sleeve.  Duodenum: Unremarkable appearing duodenum.  Small Bowel: The small bowel appears unremarkable.  Colon: There is moderate stool in the cecum ascending transverse and descending colon which could reflect an element of constipation.  Appendix: No appendix is identified.  Peritoneum: No intraperitoneal free air or ascites is seen.    Pelvis:  Bladder: The bladder appears unremarkable.  Female:  Uterus: The uterus appears unremarkable. A vaginal tampon is seen.  Ovaries: There is a 2.5 cm right ovarian cyst (series 2, image 85).    Bony structures: There is a broad-based left central and subarticular disc bulge, possibly protrusion at L4-L5. There is mild-to-moderate bilateral facet hypertrophy. There is moderate left subarticular recess narrowing with likely descending intraspinal left L5 nerve root impingement. There is moderate bilateral neural foraminal narrowing with likely exiting bilateral 4 nerve root impingement. Correlate clinically as regards further evaluation and followup with MRI.  Dorsal Spine: There is mild spondylosis of the visualized dorsal spine.      Impression:  1.  There is moderate stool in the cecum ascending transverse and descending colon which could reflect an element of constipation.  2. There is a 2.5 cm right ovarian cyst (series 2, image 85).  3. There is mild wall thickening of the distal esophagus, which may reflect reflux esophagitis. There is a small hiatal hernia.  4. There is a broad-based left central and subarticular disc bulge, possibly protrusion at L4-L5. Correlate clinically as regards further evaluation and followup with MRI.                                         Medications   HYDROmorphone (PF) injection 1 mg (has no administration in time range)   morphine injection 4 mg (0 mg Intravenous Hold 10/4/22 0215)   ondansetron injection 4 mg (0 mg Intravenous Hold 10/4/22 0215)   ondansetron injection 4 mg (4 mg Intravenous Given 10/4/22 0324)   HYDROmorphone (PF) injection 1 mg (1 mg Intravenous Given 10/4/22 0323)   droperidoL injection 0.625 mg (0.625 mg Intravenous Given 10/4/22 0430)   iopamidoL (ISOVUE-370) injection 100 mL (100 mLs Intravenous Given 10/4/22 0440)   HYDROmorphone (PF) injection 1 mg (1 mg Intravenous Given 10/4/22 0600)   HYDROmorphone (PF) injection 1 mg (1 mg Intravenous Given 10/4/22 0846)     Medical Decision Making:   Clinical Tests:   Lab Tests: Ordered and Reviewed  ED Management:  Patient is a 37 y/o presents for lower back pain.  See HPI/exam.  Reports numbness weakness of LLE.  Sees neurosurgery, has outpatient MRI scheduled for tomorrow.  Unable to tolerate pain at home, no relief with Norco.  Given multiple rounds of pain medication without relief.  Subjective numbness to LLE.  Strength is symmetric when pain controlled.  Positive SLR.  Patient reports unable to ambulate due to severity of pain despite multiple rounds of narcotic pain medication.  Labs as noted.  CT as noted. Central disc protrusion at L4-L5. MRI ordered, given additional pain medication.  Initially refused MRI, agreeable after pain control.  Discussed  with neurosurgery, discussed with medicine for admission for intractable pain.  All results discussed with patient.  Verbalized understanding and agreed to plan.         Scribe Attestation:   Scribe #1: I performed the above scribed service and the documentation accurately describes the services I performed. I attest to the accuracy of the note.    Attending Attestation:           Physician Attestation for Scribe:  Physician Attestation Statement for Scribe #1: I, Marcelino Ozuna MD, reviewed documentation, as scribed by Brett Campos in my presence, and it is both accurate and complete.           ED Course as of 10/04/22 1008   Tue Oct 04, 2022   0700 1. There is moderate stool in the cecum ascending transverse and descending colon which could reflect an element of constipation.  2. There is a 2.5 cm right ovarian cyst (series 2, image 85).  3. There is mild wall thickening of the distal esophagus, which may reflect reflux esophagitis. There is a small hiatal hernia.  4. There is a broad-based left central and subarticular disc bulge, possibly protrusion at L4-L5. Correlate clinically as regards further evaluation and followup with MRI. [RP]   0750 Pt refused MRI [BG]   0917 Patient agreeable to MRI.  [RP]   0952 Spoke with Bertram, Neurosurgery.  [RP]   0952 Discussed with hospitalist.  Will admit for intractable pain.  [RP]      ED Course User Index  [BG] Brett Campos  [RP] Mareclino Ozuna MD                 Clinical Impression:   Final diagnoses:  [R52] Intractable pain  [M54.32] Sciatica of left side (Primary)  [M54.50] Lumbar pain      ED Disposition Condition    Admit Stable                Marcelino Ozuna MD  10/04/22 1012

## 2022-10-05 ENCOUNTER — ANESTHESIA EVENT (OUTPATIENT)
Dept: SURGERY | Facility: HOSPITAL | Age: 37
DRG: 520 | End: 2022-10-05
Payer: COMMERCIAL

## 2022-10-05 ENCOUNTER — ANESTHESIA (OUTPATIENT)
Dept: SURGERY | Facility: HOSPITAL | Age: 37
DRG: 520 | End: 2022-10-05
Payer: COMMERCIAL

## 2022-10-05 LAB
APPEARANCE UR: CLEAR
B-HCG SERPL QL: NEGATIVE
BACTERIA #/AREA URNS AUTO: NORMAL /HPF
BILIRUB UR QL STRIP.AUTO: NEGATIVE MG/DL
COLOR UR AUTO: YELLOW
GLUCOSE UR QL STRIP.AUTO: NEGATIVE MG/DL
KETONES UR QL STRIP.AUTO: NEGATIVE MG/DL
LEUKOCYTE ESTERASE UR QL STRIP.AUTO: NEGATIVE UNIT/L
NITRITE UR QL STRIP.AUTO: NEGATIVE
PH UR STRIP.AUTO: 6 [PH]
PROT UR QL STRIP.AUTO: NEGATIVE MG/DL
RBC #/AREA URNS AUTO: <5 /HPF
RBC UR QL AUTO: NEGATIVE UNIT/L
SARS-COV-2 RDRP RESP QL NAA+PROBE: NEGATIVE
SP GR UR STRIP.AUTO: 1.02 (ref 1–1.03)
SQUAMOUS #/AREA URNS AUTO: <5 /HPF
UROBILINOGEN UR STRIP-ACNC: 1 MG/DL
WBC #/AREA URNS AUTO: <5 /HPF

## 2022-10-05 PROCEDURE — 25000003 PHARM REV CODE 250: Performed by: NURSE ANESTHETIST, CERTIFIED REGISTERED

## 2022-10-05 PROCEDURE — 21400001 HC TELEMETRY ROOM

## 2022-10-05 PROCEDURE — 96376 TX/PRO/DX INJ SAME DRUG ADON: CPT

## 2022-10-05 PROCEDURE — 25000003 PHARM REV CODE 250: Performed by: NEUROLOGICAL SURGERY

## 2022-10-05 PROCEDURE — 63600175 PHARM REV CODE 636 W HCPCS: Performed by: NURSE ANESTHETIST, CERTIFIED REGISTERED

## 2022-10-05 PROCEDURE — 36000710: Performed by: NEUROLOGICAL SURGERY

## 2022-10-05 PROCEDURE — 27201423 OPTIME MED/SURG SUP & DEVICES STERILE SUPPLY: Performed by: NEUROLOGICAL SURGERY

## 2022-10-05 PROCEDURE — 81001 URINALYSIS AUTO W/SCOPE: CPT | Performed by: STUDENT IN AN ORGANIZED HEALTH CARE EDUCATION/TRAINING PROGRAM

## 2022-10-05 PROCEDURE — 25000003 PHARM REV CODE 250: Performed by: INTERNAL MEDICINE

## 2022-10-05 PROCEDURE — 63600175 PHARM REV CODE 636 W HCPCS: Performed by: INTERNAL MEDICINE

## 2022-10-05 PROCEDURE — 71000033 HC RECOVERY, INTIAL HOUR: Performed by: NEUROLOGICAL SURGERY

## 2022-10-05 PROCEDURE — 94761 N-INVAS EAR/PLS OXIMETRY MLT: CPT

## 2022-10-05 PROCEDURE — 63600175 PHARM REV CODE 636 W HCPCS: Performed by: NEUROLOGICAL SURGERY

## 2022-10-05 PROCEDURE — 37000009 HC ANESTHESIA EA ADD 15 MINS: Performed by: NEUROLOGICAL SURGERY

## 2022-10-05 PROCEDURE — G0378 HOSPITAL OBSERVATION PER HR: HCPCS

## 2022-10-05 PROCEDURE — 81025 URINE PREGNANCY TEST: CPT | Performed by: STUDENT IN AN ORGANIZED HEALTH CARE EDUCATION/TRAINING PROGRAM

## 2022-10-05 PROCEDURE — 36000711: Performed by: NEUROLOGICAL SURGERY

## 2022-10-05 PROCEDURE — 37000008 HC ANESTHESIA 1ST 15 MINUTES: Performed by: NEUROLOGICAL SURGERY

## 2022-10-05 RX ORDER — CALCIUM CARBONATE 200(500)MG
500 TABLET,CHEWABLE ORAL DAILY PRN
Status: CANCELLED | OUTPATIENT
Start: 2022-10-05

## 2022-10-05 RX ORDER — HYDROCODONE BITARTRATE AND ACETAMINOPHEN 5; 325 MG/1; MG/1
1 TABLET ORAL EVERY 4 HOURS PRN
Status: CANCELLED | OUTPATIENT
Start: 2022-10-05

## 2022-10-05 RX ORDER — ONDANSETRON 4 MG/1
4 TABLET, ORALLY DISINTEGRATING ORAL EVERY 6 HOURS PRN
Status: CANCELLED | OUTPATIENT
Start: 2022-10-05

## 2022-10-05 RX ORDER — ROCURONIUM BROMIDE 10 MG/ML
INJECTION, SOLUTION INTRAVENOUS
Status: DISCONTINUED | OUTPATIENT
Start: 2022-10-05 | End: 2022-10-05

## 2022-10-05 RX ORDER — MORPHINE SULFATE 10 MG/ML
1 INJECTION INTRAMUSCULAR; INTRAVENOUS; SUBCUTANEOUS
Status: CANCELLED | OUTPATIENT
Start: 2022-10-05

## 2022-10-05 RX ORDER — KETOROLAC TROMETHAMINE 30 MG/ML
INJECTION, SOLUTION INTRAMUSCULAR; INTRAVENOUS
Status: DISCONTINUED | OUTPATIENT
Start: 2022-10-05 | End: 2022-10-05

## 2022-10-05 RX ORDER — HYDROMORPHONE HYDROCHLORIDE 2 MG/ML
INJECTION, SOLUTION INTRAMUSCULAR; INTRAVENOUS; SUBCUTANEOUS
Status: DISCONTINUED | OUTPATIENT
Start: 2022-10-05 | End: 2022-10-05

## 2022-10-05 RX ORDER — HYDROMORPHONE HYDROCHLORIDE 2 MG/ML
0.4 INJECTION, SOLUTION INTRAMUSCULAR; INTRAVENOUS; SUBCUTANEOUS EVERY 5 MIN PRN
Status: DISCONTINUED | OUTPATIENT
Start: 2022-10-05 | End: 2022-10-06 | Stop reason: HOSPADM

## 2022-10-05 RX ORDER — MORPHINE SULFATE 10 MG/ML
2 INJECTION INTRAMUSCULAR; INTRAVENOUS; SUBCUTANEOUS
Status: CANCELLED | OUTPATIENT
Start: 2022-10-05

## 2022-10-05 RX ORDER — MORPHINE SULFATE 10 MG/ML
4 INJECTION INTRAMUSCULAR; INTRAVENOUS; SUBCUTANEOUS
Status: CANCELLED | OUTPATIENT
Start: 2022-10-05

## 2022-10-05 RX ORDER — ONDANSETRON 2 MG/ML
INJECTION INTRAMUSCULAR; INTRAVENOUS
Status: DISCONTINUED | OUTPATIENT
Start: 2022-10-05 | End: 2022-10-05

## 2022-10-05 RX ORDER — MAG HYDROX/ALUMINUM HYD/SIMETH 200-200-20
30 SUSPENSION, ORAL (FINAL DOSE FORM) ORAL EVERY 4 HOURS PRN
Status: CANCELLED | OUTPATIENT
Start: 2022-10-05

## 2022-10-05 RX ORDER — DEXAMETHASONE SODIUM PHOSPHATE 4 MG/ML
INJECTION, SOLUTION INTRA-ARTICULAR; INTRALESIONAL; INTRAMUSCULAR; INTRAVENOUS; SOFT TISSUE
Status: DISCONTINUED | OUTPATIENT
Start: 2022-10-05 | End: 2022-10-05

## 2022-10-05 RX ORDER — CEFAZOLIN SODIUM 1 G/3ML
INJECTION, POWDER, FOR SOLUTION INTRAMUSCULAR; INTRAVENOUS
Status: DISCONTINUED | OUTPATIENT
Start: 2022-10-05 | End: 2022-10-05

## 2022-10-05 RX ORDER — HYDROCODONE BITARTRATE AND ACETAMINOPHEN 7.5; 325 MG/1; MG/1
2 TABLET ORAL EVERY 4 HOURS PRN
Status: CANCELLED | OUTPATIENT
Start: 2022-10-05

## 2022-10-05 RX ORDER — SODIUM CHLORIDE 9 MG/ML
INJECTION, SOLUTION INTRAVENOUS CONTINUOUS
Status: CANCELLED | OUTPATIENT
Start: 2022-10-05

## 2022-10-05 RX ORDER — HYDROCODONE BITARTRATE AND ACETAMINOPHEN 10; 325 MG/1; MG/1
1 TABLET ORAL EVERY 4 HOURS PRN
Status: CANCELLED | OUTPATIENT
Start: 2022-10-05

## 2022-10-05 RX ORDER — BUPIVACAINE HYDROCHLORIDE AND EPINEPHRINE 5; 5 MG/ML; UG/ML
INJECTION, SOLUTION EPIDURAL; INTRACAUDAL; PERINEURAL
Status: DISCONTINUED | OUTPATIENT
Start: 2022-10-05 | End: 2022-10-05 | Stop reason: HOSPADM

## 2022-10-05 RX ORDER — HYDROMORPHONE HYDROCHLORIDE 2 MG/ML
0.2 INJECTION, SOLUTION INTRAMUSCULAR; INTRAVENOUS; SUBCUTANEOUS EVERY 5 MIN PRN
Status: DISCONTINUED | OUTPATIENT
Start: 2022-10-05 | End: 2022-10-05

## 2022-10-05 RX ORDER — AMOXICILLIN 250 MG
2 CAPSULE ORAL 2 TIMES DAILY
Status: CANCELLED | OUTPATIENT
Start: 2022-10-05

## 2022-10-05 RX ORDER — ONDANSETRON 2 MG/ML
4 INJECTION INTRAMUSCULAR; INTRAVENOUS DAILY PRN
Status: DISCONTINUED | OUTPATIENT
Start: 2022-10-05 | End: 2022-10-06 | Stop reason: HOSPADM

## 2022-10-05 RX ORDER — SODIUM CHLORIDE 0.9 % (FLUSH) 0.9 %
10 SYRINGE (ML) INJECTION
Status: DISCONTINUED | OUTPATIENT
Start: 2022-10-05 | End: 2022-10-06 | Stop reason: HOSPADM

## 2022-10-05 RX ORDER — PROPOFOL 10 MG/ML
VIAL (ML) INTRAVENOUS
Status: DISCONTINUED | OUTPATIENT
Start: 2022-10-05 | End: 2022-10-05

## 2022-10-05 RX ORDER — PROCHLORPERAZINE EDISYLATE 5 MG/ML
10 INJECTION INTRAMUSCULAR; INTRAVENOUS EVERY 6 HOURS PRN
Status: CANCELLED | OUTPATIENT
Start: 2022-10-05

## 2022-10-05 RX ORDER — ACETAMINOPHEN 10 MG/ML
INJECTION, SOLUTION INTRAVENOUS
Status: DISCONTINUED | OUTPATIENT
Start: 2022-10-05 | End: 2022-10-05

## 2022-10-05 RX ORDER — HEPARIN 100 UNIT/ML
SYRINGE INTRAVENOUS
Status: DISPENSED
Start: 2022-10-05 | End: 2022-10-06

## 2022-10-05 RX ORDER — MIDAZOLAM HYDROCHLORIDE 1 MG/ML
INJECTION INTRAMUSCULAR; INTRAVENOUS
Status: DISCONTINUED | OUTPATIENT
Start: 2022-10-05 | End: 2022-10-05

## 2022-10-05 RX ORDER — BISACODYL 10 MG
10 SUPPOSITORY, RECTAL RECTAL DAILY
Status: CANCELLED | OUTPATIENT
Start: 2022-10-05

## 2022-10-05 RX ORDER — FENTANYL CITRATE 50 UG/ML
INJECTION, SOLUTION INTRAMUSCULAR; INTRAVENOUS
Status: DISCONTINUED | OUTPATIENT
Start: 2022-10-05 | End: 2022-10-05

## 2022-10-05 RX ORDER — CEFAZOLIN SODIUM 2 G/50ML
2 SOLUTION INTRAVENOUS
Status: CANCELLED | OUTPATIENT
Start: 2022-10-05 | End: 2022-10-06

## 2022-10-05 RX ADMIN — HYDROMORPHONE HYDROCHLORIDE 1 MG: 2 INJECTION INTRAMUSCULAR; INTRAVENOUS; SUBCUTANEOUS at 02:10

## 2022-10-05 RX ADMIN — ROCURONIUM BROMIDE 30 MG: 10 SOLUTION INTRAVENOUS at 04:10

## 2022-10-05 RX ADMIN — CEFAZOLIN 2 G: 330 INJECTION, POWDER, FOR SOLUTION INTRAMUSCULAR; INTRAVENOUS at 05:10

## 2022-10-05 RX ADMIN — KETOROLAC TROMETHAMINE 15 MG: 30 INJECTION, SOLUTION INTRAMUSCULAR at 06:10

## 2022-10-05 RX ADMIN — FENTANYL CITRATE 50 MCG: 50 INJECTION, SOLUTION INTRAMUSCULAR; INTRAVENOUS at 04:10

## 2022-10-05 RX ADMIN — KETOROLAC TROMETHAMINE 30 MG: 30 INJECTION, SOLUTION INTRAMUSCULAR at 05:10

## 2022-10-05 RX ADMIN — KETOROLAC TROMETHAMINE 15 MG: 30 INJECTION, SOLUTION INTRAMUSCULAR at 12:10

## 2022-10-05 RX ADMIN — HYDROMORPHONE HYDROCHLORIDE 1 MG: 2 INJECTION INTRAMUSCULAR; INTRAVENOUS; SUBCUTANEOUS at 06:10

## 2022-10-05 RX ADMIN — OXYCODONE AND ACETAMINOPHEN 1 TABLET: 10; 325 TABLET ORAL at 08:10

## 2022-10-05 RX ADMIN — HYDROMORPHONE HYDROCHLORIDE 1 MG: 2 INJECTION, SOLUTION INTRAMUSCULAR; INTRAVENOUS; SUBCUTANEOUS at 06:10

## 2022-10-05 RX ADMIN — SODIUM CHLORIDE, SODIUM GLUCONATE, SODIUM ACETATE, POTASSIUM CHLORIDE AND MAGNESIUM CHLORIDE: 526; 502; 368; 37; 30 INJECTION, SOLUTION INTRAVENOUS at 04:10

## 2022-10-05 RX ADMIN — GABAPENTIN 300 MG: 300 CAPSULE ORAL at 08:10

## 2022-10-05 RX ADMIN — FAMOTIDINE 20 MG: 20 TABLET, FILM COATED ORAL at 08:10

## 2022-10-05 RX ADMIN — LIDOCAINE 1 PATCH: 50 PATCH TOPICAL at 02:10

## 2022-10-05 RX ADMIN — HYDROMORPHONE HYDROCHLORIDE 1 MG: 2 INJECTION, SOLUTION INTRAMUSCULAR; INTRAVENOUS; SUBCUTANEOUS at 05:10

## 2022-10-05 RX ADMIN — CYCLOBENZAPRINE 10 MG: 10 TABLET, FILM COATED ORAL at 08:10

## 2022-10-05 RX ADMIN — MIDAZOLAM HYDROCHLORIDE 2 MG: 1 INJECTION, SOLUTION INTRAMUSCULAR; INTRAVENOUS at 04:10

## 2022-10-05 RX ADMIN — HYDROMORPHONE HYDROCHLORIDE 1 MG: 2 INJECTION INTRAMUSCULAR; INTRAVENOUS; SUBCUTANEOUS at 10:10

## 2022-10-05 RX ADMIN — PHENYLEPHRINE HYDROCHLORIDE 30 MCG/MIN: 10 INJECTION INTRAVENOUS at 05:10

## 2022-10-05 RX ADMIN — ACETAMINOPHEN 1000 MG: 10 INJECTION, SOLUTION INTRAVENOUS at 05:10

## 2022-10-05 RX ADMIN — ONDANSETRON 4 MG: 2 INJECTION INTRAMUSCULAR; INTRAVENOUS at 05:10

## 2022-10-05 RX ADMIN — DEXAMETHASONE SODIUM PHOSPHATE 8 MG: 4 INJECTION, SOLUTION INTRA-ARTICULAR; INTRALESIONAL; INTRAMUSCULAR; INTRAVENOUS; SOFT TISSUE at 04:10

## 2022-10-05 RX ADMIN — FENTANYL CITRATE 50 MCG: 50 INJECTION, SOLUTION INTRAMUSCULAR; INTRAVENOUS at 05:10

## 2022-10-05 RX ADMIN — PROPOFOL 120 MG: 10 INJECTION, EMULSION INTRAVENOUS at 04:10

## 2022-10-05 NOTE — ANESTHESIA PREPROCEDURE EVALUATION
10/05/2022  Vonnie Batista is a 36 y.o., female with past medical history significant for degenerative disc disease,recent steroid injection LESI 2 weeks ago, history of sciatic pain presents with intractable pain, tingling, numbness of left lower extremity.  Denies any incontinence or bowel issues.  She does report left leg weakness.  She was scheduled for an MRI on Wednesday and possible surgical evaluation with Dr. Grace to follow.     On exam she is pleasant oriented to all spheres, moves all extremities.  Endorses left lower extremity weakness.  Numbness tingling burning, extreme pain to left lower extremity that starts at the hip and goes down to her left ankle which she says is throbbing.  Upper extremities 5/5.  Right lower extremity 5/5.  Left lower extremity dorsiflex 1/5.  Plantar flexor 4/5.  Positive SLR on the left lower extremity.  She stated she was still able to ambulate but has been having this drop foot for awhile.  Unknown time frame.     CT performed with:  Broad-based left central and subarticular disc bulge, possibly protrusion at L4-L5.        Pre-op Assessment    I have reviewed the Patient Summary Reports.     I have reviewed the Nursing Notes. I have reviewed the NPO Status.   I have reviewed the Medications.     Review of Systems  Anesthesia Hx:  No problems with previous Anesthesia    Social:  Non-Smoker    Neurological:  Pain , location of R LBP/sciatica        Physical Exam  General: Well nourished    Airway:  Mallampati: II / II  Mouth Opening: Small, but > 3cm  TM Distance: 4 - 6 cm  Tongue: Normal  Neck ROM: Normal ROM    Dental:  Intact    Chest/Lungs:  Clear to auscultation    Heart:  Rate: Normal        Anesthesia Plan  Type of Anesthesia, risks & benefits discussed:    Anesthesia Type: Gen ETT  Intra-op Monitoring Plan: Standard ASA Monitors  Post Op Pain Control  Plan: multimodal analgesia  Induction:  IV  Airway Plan: Direct and Video  Informed Consent: Informed consent signed with the Patient and all parties understand the risks and agree with anesthesia plan.  All questions answered. Patient consented to blood products? Yes  ASA Score: 2    Ready For Surgery From Anesthesia Perspective.     .

## 2022-10-05 NOTE — BRIEF OP NOTE
[facility] - 4th Floor Medical Telemetry  Brief Operative Note    SUMMARY     Surgery Date: 10/5/2022     Surgeon(s) and Role:     * Dash Grace MD - Primary    Assisting Surgeon: REBECCA Painter    Pre-op Diagnosis:  Neuritis or radiculitis due to rupture of lumbar intervertebral disc [M51.16]    Post-op Diagnosis:  Post-Op Diagnosis Codes:     * Neuritis or radiculitis due to rupture of lumbar intervertebral disc [M51.16]    Procedure(s) (LRB):  DISCECTOMY, SPINE, MINIMALLY INVASIVE, USING MICROSCOPE (Left)    Left L4/5 MIS far lateral microdiscectomy. C arm. Microscope assisted    Anesthesia: General    Operative Findings: Dictated     Estimated Blood Loss: 10mL    Estimated Blood Loss has been documented.         Specimens:   Specimen (24h ago, onward)      None            [id number]

## 2022-10-05 NOTE — ANESTHESIA PROCEDURE NOTES
Peripheral IV Insertion    Diagnosis: I87.9 Disorder of vein, unspecified.    Patient location during procedure: OR  Procedure start time: 10/5/2022 4:48 PM  Timeout: 10/5/2022 4:48 PM  Procedure end time: 10/5/2022 4:48 PM    Staffing  Authorizing Provider: Mitch Nicholas MD  Performing Provider: Kathrin Espitia CRNA    Anesthesiologist was present at the time of the procedure.    Preanesthetic Checklist  Completed: patient identified, IV checked, site marked, risks and benefits discussed, surgical consent, monitors and equipment checked, pre-op evaluation, timeout performed and anesthesia consent givenPeripheral IV Insertion  Skin Prep: alcohol swabs  Orientation: right  Location: hand  Catheter Type: peripheral IV (single lumen)  Catheter Size: 20 G  Catheter placement by Anatomical landmarks. Heme positive aspiration all ports. Insertion Attempts: 1  Assessment  Dressing: secured with tape and tegaderm  Patient: Tolerated well  Line flushed easily.

## 2022-10-05 NOTE — TRANSFER OF CARE
"Anesthesia Transfer of Care Note    Patient: Vonnie Batista    Procedure(s) Performed: Procedure(s) (LRB):  DISCECTOMY, SPINE, MINIMALLY INVASIVE, USING MICROSCOPE (Left)    Patient location: PACU    Anesthesia Type: general    Transport from OR: Transported from OR on room air with adequate spontaneous ventilation    Post pain: adequate analgesia    Post assessment: no apparent anesthetic complications    Post vital signs: stable    Level of consciousness: sedated    Complications: none    Transfer of care protocol was followed      Last vitals:   Visit Vitals  /81   Pulse 73   Temp 37.2 °C (98.9 °F) (Oral)   Resp 18   Ht 5' 9" (1.753 m)   Wt 86.5 kg (190 lb 11.2 oz)   LMP 10/05/2022 (Exact Date)   SpO2 97%   Breastfeeding No   BMI 28.16 kg/m²     "

## 2022-10-05 NOTE — PT/OT/SLP PROGRESS
Physical Therapy      Patient Name:  Vonnie Batista   MRN:  [id number]    Patient going for diskectomy this afternoon. PT will hold until after surgery.

## 2022-10-05 NOTE — PROGRESS NOTES
Pt, aroldo, and RN all present for report. Pt Hx and procedure discussed in detail. Post op orders reviewed. Sx site assessed and intact. Pre-op symptoms compared to post op. Everyone understands pt info with no further questions. Pt attached to v/s machine and vitals WNL.

## 2022-10-05 NOTE — PROGRESS NOTES
[facility] HOSPITAL MEDICINE   PROGRESS NOTE        CHIEF COMPLAINT   Hospital follow up    HOSPITAL COURSE   36-year-old female with a past medical history of chronic lower back pain.  She presented to the emergency department today with complaints of intractable lower back pain and left leg heaviness.  She states that it is radiating from the left posterior buttock all the way down to the toes, denies any loss of sensation or weakness, no urinary or bowel incontinence.  He she stated that she has had back pain for many years but recently worsened and she received a lumbar epidural steroid injection recently which provided her about 2 weeks of relief but pain returned again on Saturday 4 days ago.  Neurosurgery has been consulted and we have been asked to admit the patient for further workup.  MRI lumbar spine performed which noted left central disc extrusion at L4-L5 impinges on the descending left L5 nerve root.    Today  Seen examined this morning.  Still having severe back pain was soon as the pain medication wears off.  She will be going for a diskectomy today.        OBJECTIVE/PHYSICAL EXAM     VITAL SIGNS (MOST RECENT):  Temp: 98.1 °F (36.7 °C) (10/05/22 0304)  Pulse: 73 (10/05/22 0304)  Resp: 18 (10/05/22 0621)  BP: 119/72 (10/05/22 0304)  SpO2: 95 % (10/05/22 0304) VITAL SIGNS (24 HOUR RANGE):  Temp:  [98.1 °F (36.7 °C)-98.2 °F (36.8 °C)] 98.1 °F (36.7 °C)  Pulse:  [64-85] 73  Resp:  [16-20] 18  SpO2:  [95 %-100 %] 95 %  BP: (119-144)/(72-85) 119/72   GENERAL: In no acute distress, afebrile  HEENT:  CHEST: Clear to auscultation bilaterally  HEART: S1, S2, no appreciable murmur  ABDOMEN: Soft, nontender, BS +  MSK: Warm, no lower extremity edema, no clubbing or cyanosis  NEUROLOGIC: Alert and oriented x4, 4/5 LLE  INTEGUMENTARY:  PSYCHIATRY:        ASSESSMENT/PLAN   Intractable lower back pain   Lumbar disc disease L4-5 with impingement on descending left L5 nerve  root    History of: Chronic lower back pain/sciatica        Neurosurgery following.  Diskectomy planned for today.  Pain control.  Cyclobenzaprine 10 t.i.d., 300 t.i.d. gabapentin  PT and OT     DVT prophylaxis:  Start Lovenox 40 once cleared by Neurosurgery    Anticipated discharge and disposition:   __________________________________________________________________________    LABS/MICRO/MEDS/DIAGNOSTICS       LABS  Recent Labs     10/04/22  0300      K 3.8   CHLORIDE 108*   CO2 26   BUN 15.0   CREATININE 0.70   GLUCOSE 67*   CALCIUM 9.5   ALKPHOS 46   AST 15   ALT 13   ALBUMIN 4.1     Recent Labs     10/04/22  0300   WBC 9.8   RBC 4.40   HCT 37.8   MCV 85.9          MICROBIOLOGY  Microbiology Results (last 7 days)       ** No results found for the last 168 hours. **            MEDICATIONS   cyclobenzaprine  10 mg Oral TID    famotidine  20 mg Oral BID    gabapentin  300 mg Oral TID    LIDOcaine  1 patch Transdermal Q24H      INFUSIONS      DIAGNOSTIC TESTS  MRI Lumbar Spine Without Contrast   Final Result      Left central disc extrusion at L4-L5 impinges on the descending left L5 nerve roots.         Electronically signed by: Julianne Durham   Date:    10/04/2022   Time:    12:24      CT Abdomen Pelvis With Contrast   Final Result      1. No acute abdominopelvic findings.   2. Possible disc protrusion or extrusion L4-L5.   3.  No significant discrepancy with the preliminary report.         Electronically signed by: Nishant Flores   Date:    10/04/2022   Time:    07:31               All diagnosis and differential diagnosis have been reviewed; assessment and plan has been documented. I have personally reviewed the labs and test results that are presently available; I have reviewed the patients medication list. I have reviewed the consulting providers response and recommendations. I have reviewed or attempted to review medical records based upon their availability.  All of the patient's questions have  been addressed and answered. Patient's is agreeable to the above stated plan. I will continue to monitor closely and make adjustments to medical management as needed.  This document was created using M*Modal Fluency Direct.  Transcription errors may have been made.  Please contact me if any questions may rise regarding documentation to clarify verbiage.        Miko Ozuna MD   10/05/2022   Internal Medicine

## 2022-10-05 NOTE — ANESTHESIA PROCEDURE NOTES
Intubation    Date/Time: 10/5/2022 4:36 PM  Performed by: Юлия Brunson  Authorized by: Mitch Nicholas MD     Intubation:     Induction:  Intravenous    Intubated:  Postinduction    Mask Ventilation:  Easy mask    Attempts:  1    Attempted By:  Student (kwabena dunn)    Method of Intubation:  Direct    Blade:  Forrest 2    Laryngeal View Grade: Grade I - full view of cords      Difficult Airway Encountered?: No      Complications:  None    Airway Device:  Oral endotracheal tube    Airway Device Size:  7.5    Style/Cuff Inflation:  Cuffed (inflated to minimal occlusive pressure)    Tube secured:  21    Secured at:  The teeth    Placement Verified By:  Capnometry    Complicating Factors:  None    Findings Post-Intubation:  BS equal bilateral and atraumatic/condition of teeth unchanged

## 2022-10-06 VITALS
DIASTOLIC BLOOD PRESSURE: 78 MMHG | RESPIRATION RATE: 18 BRPM | OXYGEN SATURATION: 100 % | HEIGHT: 69 IN | TEMPERATURE: 99 F | WEIGHT: 190.69 LBS | SYSTOLIC BLOOD PRESSURE: 124 MMHG | HEART RATE: 89 BPM | BODY MASS INDEX: 28.24 KG/M2

## 2022-10-06 PROBLEM — M51.26 LUMBAR DISC HERNIATION: Status: ACTIVE | Noted: 2022-10-06

## 2022-10-06 PROCEDURE — 96376 TX/PRO/DX INJ SAME DRUG ADON: CPT

## 2022-10-06 PROCEDURE — G0378 HOSPITAL OBSERVATION PER HR: HCPCS

## 2022-10-06 PROCEDURE — 97161 PT EVAL LOW COMPLEX 20 MIN: CPT

## 2022-10-06 PROCEDURE — 25000003 PHARM REV CODE 250: Performed by: INTERNAL MEDICINE

## 2022-10-06 PROCEDURE — 63600175 PHARM REV CODE 636 W HCPCS: Performed by: INTERNAL MEDICINE

## 2022-10-06 RX ORDER — AMOXICILLIN 250 MG
2 CAPSULE ORAL ONCE
Status: DISCONTINUED | OUTPATIENT
Start: 2022-10-06 | End: 2022-10-06 | Stop reason: HOSPADM

## 2022-10-06 RX ORDER — OXYCODONE AND ACETAMINOPHEN 5; 325 MG/1; MG/1
1 TABLET ORAL EVERY 6 HOURS PRN
Qty: 20 TABLET | Refills: 0 | Status: SHIPPED | OUTPATIENT
Start: 2022-10-06

## 2022-10-06 RX ADMIN — CYCLOBENZAPRINE 10 MG: 10 TABLET, FILM COATED ORAL at 08:10

## 2022-10-06 RX ADMIN — GABAPENTIN 300 MG: 300 CAPSULE ORAL at 08:10

## 2022-10-06 RX ADMIN — OXYCODONE AND ACETAMINOPHEN 1 TABLET: 10; 325 TABLET ORAL at 08:10

## 2022-10-06 RX ADMIN — HYDROMORPHONE HYDROCHLORIDE 1 MG: 2 INJECTION INTRAMUSCULAR; INTRAVENOUS; SUBCUTANEOUS at 04:10

## 2022-10-06 NOTE — PROGRESS NOTES
Awake, alert.   Pain controlled.   Moves all extremities well. Ambulated.   PT/OT.   Ok for discharge this afternoon.   Follow up in office on 10/19/22 at 9am.

## 2022-10-06 NOTE — PT/OT/SLP EVAL
Physical Therapy Evaluation and Discharge Note    Patient Name:  Vonnie Batista   MRN:  [id number]    Recommendations:     Discharge Recommendations:  home   Discharge Equipment Recommendations: none   Barriers to discharge: None    Assessment:     Vonnie Batista is a 36 y.o. female admitted with a medical diagnosis of Lumbar disc herniation. .  At this time, patient is functioning at their prior level of function and does not require further acute PT services.     Recent Surgery: Procedure(s) (LRB):  DISCECTOMY, SPINE, MINIMALLY INVASIVE, USING MICROSCOPE (Left) 1 Day Post-Op    Plan:     During this hospitalization, patient does not require further acute PT services.  Please re-consult if situation changes.      Subjective     Chief Complaint: none  Patient/Family Comments/goals: none  Pain/Comfort:  Pain Rating 1: 5/10  Location 1: back  Pain Addressed 1: Distraction, Reposition  Pain Rating Post-Intervention 1: 5/10    Patients cultural, spiritual, Mandaeism conflicts given the current situation: no    Living Environment:  Lives with spouse in house with 3 steps to enter.   Prior to admission, patients level of function was independent.  Equipment used at home: none.  DME owned (not currently used): none.  Upon discharge, patient will have assistance from spouse.    Objective:     Communicated with nurse prior to session.  Patient found supine with telemetry upon PT entry to room.    General Precautions: Standard,     Orthopedic Precautions:    Braces:     Respiratory Status: Room air    Exams:  Cognitive Exam:  Patient is oriented to Person, Place, Time, and Situation  Sensation:    -       Intact  RLE ROM: WNL  RLE Strength: WNL  LLE ROM: WNL  LLE Strength: WNL    Functional Mobility:  Bed Mobility:     Supine to Sit: independence  Transfers:     Sit to Stand:  independence with no AD  Gait: 85 ft independently   Balance: good  Stairs: ascended/descended 4 steps independently    AM-PAC 6 CLICK  MOBILITY  Total Score:24     AM-PAC 6 CLICK MOBILITY  Total Score:24     Patient left ambulatory in room/heath with all lines intact, call button in reach, and spouse present.    GOALS:   Multidisciplinary Problems       Physical Therapy Goals       Not on file                    History:     History reviewed. No pertinent past medical history.    History reviewed. No pertinent surgical history.    Time Tracking:     PT Received On: 10/06/22  PT Start Time: 0730     PT Stop Time: 0742  PT Total Time (min): 12 min     Billable Minutes: Evaluation 12 minutes      10/06/2022

## 2022-10-06 NOTE — OP NOTE
[facility]                       [address] [id number]    PATIENT NAME:      GHISLAINE ROBLES   YOB: 1985  CSN:               [id number]  MRN:               [id number]  ADMIT DATE:        10/03/2022 20:54:00  PHYSICIAN:         Dash Grace MD                          OPERATIVE REPORT      DATE OF SURGERY:    10/05/2022 00:00:00    SURGEON:  Dash Grace MD    ASSISTANT:  Mike Fink    PREOPERATIVE DIAGNOSES:    1. Left L4-5 descended disk bilaterally.   2. Severe back pain.   3. Leg pain with inability to walk.    4. Weakness in the leg.    POSTOPERATIVE DIAGNOSES:    1. Left L4-5 descended disk bilaterally.   2. Severe back pain.   3. Leg pain with inability to walk.    4. Weakness in the leg.    PROCEDURE:  Left L4-5 hemilaminectomy, medial facetectomy, foraminotomy, removal   of bilateral descended disk off the nerve.    COMPLICATIONS:  There were no issues and no complications.    SPECIMEN:  Disk.    INDICATIONS FOR SURGERY:  The patient is a 36-year-old, with disk herniation,   who was admitted yesterday with bilateral disk pushing downward.  She   subsequently came to my office, she could not walk, move, she was miserable.    She was admitted and now here for bilateral discectomy.  The risks, benefits,   consent were discussed.  We discussed surgery done.  All of this was discussed   in detail.  Patient understood and wanted to proceed with surgery.  Now here for   MIS far lateral discectomy.  We spent some time going over everything.  We   discussed how it is done, and the family was grateful that I am taking care of   her right away.    OPERATIVE PROCEDURE:  Brought to the operating room, intubated, turned prone.    Back was prepped and draped.  We made an off midline incision.  Did a   hemilaminectomy and medial facet foraminotomy, gradually seeing the nerve root   and thecal sac.  As we did  that, we could see the disk space.  We entered the   disk space, but lower down was a disk fragment.  We teased it and noted a   fragment came out.  We used a long upgoing ball-tip, and another larger fragment   came out, so 2 good size fragments came out.  The thecal sac nerve root was   relieved.  We entered the disk space and took disk material out.  Once the area   was nice and dry, hemostasis obtained, we irrigated the wound, put Floseal in   and closed the wound on the way out with bipolar cautery and closed the wound   with 0 Vicryl and 3-0 Vicryl running subcu.      There were no issues or complications from my standpoint.        ______________________________  MD CASTILLO Joshi/NADINE  DD:  10/05/2022  Time:  05:47PM  DT:  10/05/2022  Time:  08:25PM  Job #:  [id number]      OPERATIVE REPORT

## 2022-10-06 NOTE — ANESTHESIA POSTPROCEDURE EVALUATION
Anesthesia Post Evaluation    Patient: Vonnie Batista    Procedure(s) Performed: Procedure(s) (LRB):  DISCECTOMY, SPINE, MINIMALLY INVASIVE, USING MICROSCOPE (Left)    Final Anesthesia Type: general      Patient location during evaluation: PACU  Patient participation: Yes- Able to Participate  Level of consciousness: awake and alert  Post-procedure vital signs: reviewed and stable  Pain management: adequate  Airway patency: patent  JULIANA mitigation strategies: Multimodal analgesia    Anesthetic complications: no      Cardiovascular status: stable  Respiratory status: unassisted  Hydration status: euvolemic  Follow-up not needed.          Vitals Value Taken Time   /76 10/05/22 1842   Temp 36.4 °C (97.5 °F) 10/05/22 1820   Pulse 74 10/05/22 1842   Resp 14 10/05/22 1842   SpO2 97 % 10/05/22 1842   Vitals shown include unvalidated device data.      Event Time   Out of Recovery 10/05/2022 18:42:00         Pain/Elvis Score: Pain Rating Prior to Med Admin: 8 (10/5/2022  2:30 PM)  Pain Rating Post Med Admin: 4 (10/5/2022  3:00 PM)  Elvis Score: 9 (10/5/2022  6:42 PM)

## 2022-10-06 NOTE — DISCHARGE SUMMARY
[facility]  HOSPITAL MEDICINE   DISCHARGE SUMMARY    Patient Name: Vonnie Batista  MRN: [id number]  Admission Date: 10/3/2022  Hospital Length of Stay: 2 days  Discharge Date and Time: 10/06/2022  Discharge Provider: Miko Ozuna  Primary Care Provider: Primary Doctor No      HOSPITAL COURSE   36-year-old female with a past medical history of chronic lower back pain.  She presented to the emergency department today with complaints of intractable lower back pain and left leg heaviness.  She states that it is radiating from the left posterior buttock all the way down to the toes, denies any loss of sensation or weakness, no urinary or bowel incontinence.  He she stated that she has had back pain for many years but recently worsened and she received a lumbar epidural steroid injection recently which provided her about 2 weeks of relief but pain returned again on Saturday 4 days ago.  Neurosurgery has been consulted and we have been asked to admit the patient for further workup.  MRI lumbar spine performed which noted left central disc extrusion at L4-L5 impinges on the descending left L5 nerve root.  Postoperatively she is doing well and almost resolution of pain.  She worked with physical therapy and states that she is ready to go home        PHYSICAL EXAM     Most Recent Vital Signs:  Temp: 99 °F (37.2 °C) (10/06/22 0808)  Pulse: 69 (10/06/22 0808)  Resp: 18 (10/06/22 0400)  BP: 130/76 (10/06/22 0808)  SpO2: 98 % (10/06/22 0808)   GENERAL: In no acute distress, afebrile  HEENT:  CHEST: Clear to auscultation bilaterally  HEART: S1, S2, no appreciable murmur  ABDOMEN: Soft, nontender, BS +  MSK: Warm, no lower extremity edema, no clubbing or cyanosis  NEUROLOGIC: Alert and oriented x4, moving all extremities with good strength   INTEGUMENTARY:  PSYCHIATRY:          DISCHARGE DIAGNOSIS   Intractable lower back pain   Lumbar disc disease L4-5 with impingement on descending left L5 nerve  root-status post microdiskectomy    History of: Chronic lower back pain/sciatica  _____________________________________________________________________________      DISCHARGE MED REC     Current Discharge Medication List        START taking these medications    Details   oxyCODONE-acetaminophen (PERCOCET) 5-325 mg per tablet Take 1 tablet by mouth every 6 (six) hours as needed for Pain.  Qty: 20 tablet, Refills: 0    Comments: Quantity prescribed more than 7 day supply? No           CONTINUE these medications which have NOT CHANGED    Details   buPROPion (WELLBUTRIN XL) 150 MG TB24 tablet Take 150 mg by mouth once daily.      cyclobenzaprine (FLEXERIL) 10 MG tablet Take 10 mg by mouth nightly as needed.      hydrOXYchloroQUINE (PLAQUENIL) 200 mg tablet Take 400 mg by mouth once daily.      pantoprazole (PROTONIX) 40 MG tablet Take 40 mg by mouth once daily.      SLYND 4 mg (28) Tab Take 1 tablet by mouth once daily.                CONSULTS         FOLLOW UP      Follow-up Information       Dash Grace MD Follow up on 10/19/2022.    Specialty: Neurosurgery  Why: at 9am  Contact information:  [address] [place] [id number] [phone]                                 DISCHARGE INSTRUCTIONS     Explained in detail to the patient about the discharge plan, medications, and follow-up visits. Pt understands and agrees with the treatment plan.  Discharged Condition: stable  Diet as tolerated  Activities as tolerated  Discharge to: home     TIME SPENT ON DISCHARGE   35 minutes        Miko Galarza M.D on 10/6/2022  Internal Medicine  Department of [facility] Medicine    This document was created using electronic dictation services.  Please excuse any errors that may have been made.  Contact me if any questions regarding documentation to clarify verbiage.

## 2022-10-07 ENCOUNTER — PATIENT MESSAGE (OUTPATIENT)
Dept: ADMINISTRATIVE | Facility: OTHER | Age: 37
End: 2022-10-07
Payer: COMMERCIAL

## 2022-10-11 NOTE — CLINICAL REVIEW
36-year-old female admitted with left-sided sciatica, intractable pain.  Underwent minimally invasive discectomy.  There were no perioperative complications.  The patient discharged within 24 hours after surgery.  The procedure is not designated IV only.  The patient was appropriate for observation setting    No Danielle MD  Utilization Management  Physician Advisor

## 2022-11-22 ENCOUNTER — ANESTHESIA (OUTPATIENT)
Dept: SURGERY | Facility: HOSPITAL | Age: 37
End: 2022-11-22

## 2022-11-22 ENCOUNTER — ANESTHESIA EVENT (OUTPATIENT)
Dept: SURGERY | Facility: HOSPITAL | Age: 37
End: 2022-11-22

## 2022-11-22 ENCOUNTER — HOSPITAL ENCOUNTER (OUTPATIENT)
Facility: HOSPITAL | Age: 37
Discharge: HOME | End: 2022-11-22
Attending: SURGERY | Admitting: SURGERY
Payer: COMMERCIAL

## 2022-11-22 LAB
B-HCG UR QL: NEGATIVE
CTP QC/QA: YES

## 2022-11-22 PROCEDURE — 36000706: Performed by: SURGERY

## 2022-11-22 PROCEDURE — 63600175 PHARM REV CODE 636 W HCPCS: Performed by: SURGERY

## 2022-11-22 PROCEDURE — 63600175 PHARM REV CODE 636 W HCPCS: Performed by: NURSE ANESTHETIST, CERTIFIED REGISTERED

## 2022-11-22 PROCEDURE — 25000003 PHARM REV CODE 250: Performed by: NURSE ANESTHETIST, CERTIFIED REGISTERED

## 2022-11-22 PROCEDURE — 37000009 HC ANESTHESIA EA ADD 15 MINS: Performed by: SURGERY

## 2022-11-22 PROCEDURE — 63600175 PHARM REV CODE 636 W HCPCS

## 2022-11-22 PROCEDURE — 63600175 PHARM REV CODE 636 W HCPCS: Performed by: ANESTHESIOLOGY

## 2022-11-22 PROCEDURE — A4216 STERILE WATER/SALINE, 10 ML: HCPCS | Performed by: SURGERY

## 2022-11-22 PROCEDURE — 71000015 HC POSTOP RECOV 1ST HR: Performed by: SURGERY

## 2022-11-22 PROCEDURE — 71000033 HC RECOVERY, INTIAL HOUR: Performed by: SURGERY

## 2022-11-22 PROCEDURE — 36000707: Performed by: SURGERY

## 2022-11-22 PROCEDURE — 15877 PR SUCT ASSIS LIPECTOMY,TRUNK: ICD-10-PCS | Mod: 51,CSM,, | Performed by: SURGERY

## 2022-11-22 PROCEDURE — 81025 URINE PREGNANCY TEST: CPT | Performed by: SURGERY

## 2022-11-22 PROCEDURE — C9290 INJ, BUPIVACAINE LIPOSOME: HCPCS | Performed by: SURGERY

## 2022-11-22 PROCEDURE — 37000008 HC ANESTHESIA 1ST 15 MINUTES: Performed by: SURGERY

## 2022-11-22 PROCEDURE — 19325 PR ENLARGE BREAST WITH IMPLANT: ICD-10-PCS | Mod: 50,CSM,, | Performed by: SURGERY

## 2022-11-22 PROCEDURE — 71000016 HC POSTOP RECOV ADDL HR: Performed by: SURGERY

## 2022-11-22 PROCEDURE — C1789 PROSTHESIS, BREAST, IMP: HCPCS | Performed by: SURGERY

## 2022-11-22 PROCEDURE — 25000003 PHARM REV CODE 250: Performed by: SURGERY

## 2022-11-22 PROCEDURE — 19325 BREAST AUGMENTATION W/IMPLT: CPT | Mod: 50,CSM,, | Performed by: SURGERY

## 2022-11-22 PROCEDURE — 15877 SUCTION LIPECTOMY TRUNK: CPT | Mod: 51,CSM,, | Performed by: SURGERY

## 2022-11-22 RX ORDER — CEFAZOLIN 2 G/1
INJECTION, POWDER, FOR SOLUTION INTRAMUSCULAR; INTRAVENOUS
Status: DISCONTINUED
Start: 2022-11-22 | End: 2022-11-22 | Stop reason: HOSPADM

## 2022-11-22 RX ORDER — CYCLOBENZAPRINE HCL 10 MG
10 TABLET ORAL 3 TIMES DAILY PRN
Status: CANCELLED | OUTPATIENT
Start: 2022-11-22

## 2022-11-22 RX ORDER — CEFAZOLIN SODIUM 2 G/50ML
2 SOLUTION INTRAVENOUS
Status: DISCONTINUED | OUTPATIENT
Start: 2022-11-22 | End: 2022-11-22 | Stop reason: HOSPADM

## 2022-11-22 RX ORDER — SODIUM CHLORIDE 9 MG/ML
INJECTION, SOLUTION INTRAMUSCULAR; INTRAVENOUS; SUBCUTANEOUS
Status: DISCONTINUED
Start: 2022-11-22 | End: 2022-11-22 | Stop reason: HOSPADM

## 2022-11-22 RX ORDER — LIDOCAINE HYDROCHLORIDE 10 MG/ML
INJECTION, SOLUTION EPIDURAL; INFILTRATION; INTRACAUDAL; PERINEURAL
Status: DISCONTINUED | OUTPATIENT
Start: 2022-11-22 | End: 2022-11-22

## 2022-11-22 RX ORDER — ROCURONIUM BROMIDE 10 MG/ML
INJECTION, SOLUTION INTRAVENOUS
Status: DISCONTINUED | OUTPATIENT
Start: 2022-11-22 | End: 2022-11-22

## 2022-11-22 RX ORDER — GENTAMICIN SULFATE 40 MG/ML
INJECTION, SOLUTION INTRAMUSCULAR; INTRAVENOUS
Status: DISCONTINUED | OUTPATIENT
Start: 2022-11-22 | End: 2022-11-22 | Stop reason: HOSPADM

## 2022-11-22 RX ORDER — MIDAZOLAM HYDROCHLORIDE 1 MG/ML
INJECTION INTRAMUSCULAR; INTRAVENOUS
Status: COMPLETED
Start: 2022-11-22 | End: 2022-11-22

## 2022-11-22 RX ORDER — HYDROMORPHONE HYDROCHLORIDE 2 MG/ML
INJECTION, SOLUTION INTRAMUSCULAR; INTRAVENOUS; SUBCUTANEOUS
Status: COMPLETED
Start: 2022-11-22 | End: 2022-11-22

## 2022-11-22 RX ORDER — DIPHENHYDRAMINE HYDROCHLORIDE 50 MG/ML
25 INJECTION INTRAMUSCULAR; INTRAVENOUS EVERY 6 HOURS PRN
Status: DISCONTINUED | OUTPATIENT
Start: 2022-11-22 | End: 2022-11-22 | Stop reason: HOSPADM

## 2022-11-22 RX ORDER — ALBUTEROL SULFATE 90 UG/1
AEROSOL, METERED RESPIRATORY (INHALATION)
COMMUNITY
Start: 2022-08-21

## 2022-11-22 RX ORDER — CELECOXIB 200 MG/1
CAPSULE ORAL
COMMUNITY

## 2022-11-22 RX ORDER — METHOCARBAMOL 500 MG/1
500 TABLET, FILM COATED ORAL EVERY 6 HOURS PRN
COMMUNITY
Start: 2022-08-05

## 2022-11-22 RX ORDER — SODIUM CHLORIDE, SODIUM GLUCONATE, SODIUM ACETATE, POTASSIUM CHLORIDE AND MAGNESIUM CHLORIDE 30; 37; 368; 526; 502 MG/100ML; MG/100ML; MG/100ML; MG/100ML; MG/100ML
INJECTION, SOLUTION INTRAVENOUS CONTINUOUS
Status: DISCONTINUED | OUTPATIENT
Start: 2022-11-22 | End: 2022-11-22 | Stop reason: HOSPADM

## 2022-11-22 RX ORDER — ACETAMINOPHEN 10 MG/ML
1000 INJECTION, SOLUTION INTRAVENOUS ONCE
Status: DISCONTINUED | OUTPATIENT
Start: 2022-11-22 | End: 2022-11-22 | Stop reason: HOSPADM

## 2022-11-22 RX ORDER — CEFAZOLIN SODIUM 1 G/3ML
INJECTION, POWDER, FOR SOLUTION INTRAMUSCULAR; INTRAVENOUS
Status: DISCONTINUED | OUTPATIENT
Start: 2022-11-22 | End: 2022-11-22 | Stop reason: HOSPADM

## 2022-11-22 RX ORDER — PROCHLORPERAZINE EDISYLATE 5 MG/ML
5 INJECTION INTRAMUSCULAR; INTRAVENOUS EVERY 6 HOURS PRN
Status: CANCELLED | OUTPATIENT
Start: 2022-11-22

## 2022-11-22 RX ORDER — OXYCODONE AND ACETAMINOPHEN 5; 325 MG/1; MG/1
2 TABLET ORAL EVERY 4 HOURS PRN
Status: DISCONTINUED | OUTPATIENT
Start: 2022-11-22 | End: 2022-11-22 | Stop reason: HOSPADM

## 2022-11-22 RX ORDER — BUPIVACAINE HYDROCHLORIDE AND EPINEPHRINE 5; 5 MG/ML; UG/ML
INJECTION, SOLUTION EPIDURAL; INTRACAUDAL; PERINEURAL
Status: DISCONTINUED
Start: 2022-11-22 | End: 2022-11-22 | Stop reason: HOSPADM

## 2022-11-22 RX ORDER — SODIUM CHLORIDE 0.9 % (FLUSH) 0.9 %
SYRINGE (ML) INJECTION
Status: DISCONTINUED | OUTPATIENT
Start: 2022-11-22 | End: 2022-11-22 | Stop reason: HOSPADM

## 2022-11-22 RX ORDER — ONDANSETRON 2 MG/ML
4 INJECTION INTRAMUSCULAR; INTRAVENOUS EVERY 12 HOURS PRN
Status: CANCELLED | OUTPATIENT
Start: 2022-11-22

## 2022-11-22 RX ORDER — MIDAZOLAM HYDROCHLORIDE 1 MG/ML
2 INJECTION INTRAMUSCULAR; INTRAVENOUS ONCE AS NEEDED
Status: COMPLETED | OUTPATIENT
Start: 2022-11-22 | End: 2022-11-22

## 2022-11-22 RX ORDER — NEOSTIGMINE METHYLSULFATE 1 MG/ML
INJECTION, SOLUTION INTRAVENOUS
Status: DISCONTINUED | OUTPATIENT
Start: 2022-11-22 | End: 2022-11-22

## 2022-11-22 RX ORDER — BUPIVACAINE HYDROCHLORIDE AND EPINEPHRINE 5; 5 MG/ML; UG/ML
INJECTION, SOLUTION EPIDURAL; INTRACAUDAL; PERINEURAL
Status: DISCONTINUED | OUTPATIENT
Start: 2022-11-22 | End: 2022-11-22 | Stop reason: HOSPADM

## 2022-11-22 RX ORDER — METHOCARBAMOL 100 MG/ML
1000 INJECTION, SOLUTION INTRAMUSCULAR; INTRAVENOUS ONCE
Status: COMPLETED | OUTPATIENT
Start: 2022-11-22 | End: 2022-11-22

## 2022-11-22 RX ORDER — CEFAZOLIN SODIUM 2 G/50ML
2 SOLUTION INTRAVENOUS
Status: CANCELLED | OUTPATIENT
Start: 2022-11-22 | End: 2022-11-23

## 2022-11-22 RX ORDER — HYDROMORPHONE HYDROCHLORIDE 2 MG/ML
0.2 INJECTION, SOLUTION INTRAMUSCULAR; INTRAVENOUS; SUBCUTANEOUS EVERY 5 MIN PRN
Status: DISCONTINUED | OUTPATIENT
Start: 2022-11-22 | End: 2022-11-22

## 2022-11-22 RX ORDER — GLYCOPYRROLATE 0.2 MG/ML
INJECTION INTRAMUSCULAR; INTRAVENOUS
Status: DISCONTINUED | OUTPATIENT
Start: 2022-11-22 | End: 2022-11-22

## 2022-11-22 RX ORDER — MORPHINE SULFATE 4 MG/ML
2 INJECTION, SOLUTION INTRAMUSCULAR; INTRAVENOUS EVERY 4 HOURS PRN
Status: CANCELLED | OUTPATIENT
Start: 2022-11-22

## 2022-11-22 RX ORDER — PROPOFOL 10 MG/ML
VIAL (ML) INTRAVENOUS
Status: DISCONTINUED | OUTPATIENT
Start: 2022-11-22 | End: 2022-11-22

## 2022-11-22 RX ORDER — DEXAMETHASONE SODIUM PHOSPHATE 4 MG/ML
INJECTION, SOLUTION INTRA-ARTICULAR; INTRALESIONAL; INTRAMUSCULAR; INTRAVENOUS; SOFT TISSUE
Status: DISCONTINUED | OUTPATIENT
Start: 2022-11-22 | End: 2022-11-22

## 2022-11-22 RX ORDER — ACETAMINOPHEN 10 MG/ML
INJECTION, SOLUTION INTRAVENOUS
Status: DISCONTINUED | OUTPATIENT
Start: 2022-11-22 | End: 2022-11-22

## 2022-11-22 RX ORDER — CEFAZOLIN SODIUM 1 G/3ML
INJECTION, POWDER, FOR SOLUTION INTRAMUSCULAR; INTRAVENOUS
Status: DISCONTINUED
Start: 2022-11-22 | End: 2022-11-22 | Stop reason: HOSPADM

## 2022-11-22 RX ORDER — ONDANSETRON HYDROCHLORIDE 2 MG/ML
INJECTION, SOLUTION INTRAMUSCULAR; INTRAVENOUS
Status: DISCONTINUED | OUTPATIENT
Start: 2022-11-22 | End: 2022-11-22

## 2022-11-22 RX ORDER — GENTAMICIN SULFATE 40 MG/ML
INJECTION, SOLUTION INTRAMUSCULAR; INTRAVENOUS
Status: DISCONTINUED
Start: 2022-11-22 | End: 2022-11-22 | Stop reason: HOSPADM

## 2022-11-22 RX ORDER — LIDOCAINE HYDROCHLORIDE 10 MG/ML
1 INJECTION, SOLUTION EPIDURAL; INFILTRATION; INTRACAUDAL; PERINEURAL ONCE
Status: DISCONTINUED | OUTPATIENT
Start: 2022-11-22 | End: 2022-11-22 | Stop reason: HOSPADM

## 2022-11-22 RX ORDER — ONDANSETRON 2 MG/ML
4 INJECTION INTRAMUSCULAR; INTRAVENOUS DAILY PRN
Status: DISCONTINUED | OUTPATIENT
Start: 2022-11-22 | End: 2022-11-22 | Stop reason: HOSPADM

## 2022-11-22 RX ORDER — SODIUM CHLORIDE 9 MG/ML
INJECTION, SOLUTION INTRAVENOUS CONTINUOUS
Status: CANCELLED | OUTPATIENT
Start: 2022-11-22

## 2022-11-22 RX ORDER — HYDROMORPHONE HYDROCHLORIDE 2 MG/ML
0.4 INJECTION, SOLUTION INTRAMUSCULAR; INTRAVENOUS; SUBCUTANEOUS EVERY 5 MIN PRN
Status: COMPLETED | OUTPATIENT
Start: 2022-11-22 | End: 2022-11-22

## 2022-11-22 RX ORDER — FENTANYL CITRATE 50 UG/ML
INJECTION, SOLUTION INTRAMUSCULAR; INTRAVENOUS
Status: DISCONTINUED | OUTPATIENT
Start: 2022-11-22 | End: 2022-11-22

## 2022-11-22 RX ADMIN — HYDROMORPHONE HYDROCHLORIDE 0.4 MG: 2 INJECTION INTRAMUSCULAR; INTRAVENOUS; SUBCUTANEOUS at 11:11

## 2022-11-22 RX ADMIN — DEXAMETHASONE SODIUM PHOSPHATE 4 MG: 4 INJECTION, SOLUTION INTRA-ARTICULAR; INTRALESIONAL; INTRAMUSCULAR; INTRAVENOUS; SOFT TISSUE at 07:11

## 2022-11-22 RX ADMIN — GLYCOPYRROLATE 0.4 MG: 0.2 INJECTION INTRAMUSCULAR; INTRAVENOUS at 10:11

## 2022-11-22 RX ADMIN — MIDAZOLAM HYDROCHLORIDE 2 MG: 1 INJECTION, SOLUTION INTRAMUSCULAR; INTRAVENOUS at 07:11

## 2022-11-22 RX ADMIN — FENTANYL CITRATE 50 MCG: 50 INJECTION, SOLUTION INTRAMUSCULAR; INTRAVENOUS at 08:11

## 2022-11-22 RX ADMIN — ONDANSETRON 4 MG: 2 INJECTION INTRAMUSCULAR; INTRAVENOUS at 07:11

## 2022-11-22 RX ADMIN — MIDAZOLAM HYDROCHLORIDE 2 MG: 1 INJECTION INTRAMUSCULAR; INTRAVENOUS at 07:11

## 2022-11-22 RX ADMIN — METHOCARBAMOL 1000 MG: 100 INJECTION INTRAMUSCULAR; INTRAVENOUS at 11:11

## 2022-11-22 RX ADMIN — FENTANYL CITRATE 50 MCG: 50 INJECTION, SOLUTION INTRAMUSCULAR; INTRAVENOUS at 07:11

## 2022-11-22 RX ADMIN — FENTANYL CITRATE 50 MCG: 50 INJECTION, SOLUTION INTRAMUSCULAR; INTRAVENOUS at 10:11

## 2022-11-22 RX ADMIN — SODIUM CHLORIDE, POTASSIUM CHLORIDE, SODIUM LACTATE AND CALCIUM CHLORIDE: 600; 310; 30; 20 INJECTION, SOLUTION INTRAVENOUS at 07:11

## 2022-11-22 RX ADMIN — ROCURONIUM BROMIDE 50 MG: 50 INJECTION INTRAVENOUS at 07:11

## 2022-11-22 RX ADMIN — FENTANYL CITRATE 50 MCG: 50 INJECTION, SOLUTION INTRAMUSCULAR; INTRAVENOUS at 09:11

## 2022-11-22 RX ADMIN — ROCURONIUM BROMIDE 30 MG: 50 INJECTION INTRAVENOUS at 08:11

## 2022-11-22 RX ADMIN — NEOSTIGMINE METHYLSULFATE 3 MG: 1 INJECTION INTRAVENOUS at 10:11

## 2022-11-22 RX ADMIN — ROCURONIUM BROMIDE 20 MG: 50 INJECTION INTRAVENOUS at 09:11

## 2022-11-22 RX ADMIN — LIDOCAINE HYDROCHLORIDE 50 MG: 10 INJECTION, SOLUTION EPIDURAL; INFILTRATION; INTRACAUDAL; PERINEURAL at 07:11

## 2022-11-22 RX ADMIN — CEFAZOLIN SODIUM 2 G: 2 SOLUTION INTRAVENOUS at 07:11

## 2022-11-22 RX ADMIN — SODIUM CHLORIDE, POTASSIUM CHLORIDE, SODIUM LACTATE AND CALCIUM CHLORIDE: 600; 310; 30; 20 INJECTION, SOLUTION INTRAVENOUS at 09:11

## 2022-11-22 RX ADMIN — ACETAMINOPHEN 1000 MG: 10 INJECTION, SOLUTION INTRAVENOUS at 07:11

## 2022-11-22 RX ADMIN — PROPOFOL 150 MG: 10 INJECTION, EMULSION INTRAVENOUS at 07:11

## 2022-11-22 NOTE — ANESTHESIA PROCEDURE NOTES
Intubation    Date/Time: 11/22/2022 7:23 AM  Performed by: Rachael Triplett CRNA  Authorized by: Sree Castro MD     Intubation:     Induction:  Intravenous    Intubated:  Postinduction    Mask Ventilation:  Easy mask    Attempts:  1    Attempted By:  CRNA    Method of Intubation:  Direct    Blade:  Forrest 2    Laryngeal View Grade: Grade I - full view of cords      Difficult Airway Encountered?: No      Complications:  None    Airway Device:  Oral endotracheal tube    Airway Device Size:  7.0    Style/Cuff Inflation:  Cuffed (inflated to minimal occlusive pressure)    Inflation Amount (mL):  3    Tube secured:  21    Secured at:  The lips    Placement Verified By:  Capnometry    Complicating Factors:  None    Findings Post-Intubation:  BS equal bilateral

## 2022-11-22 NOTE — OP NOTE
[facility] [address] [place] [address]    PATIENT NAME:      GHISLAINE ROBLES   YOB: 1985  CSN:               [id number]  MRN:               [id number]  ADMIT DATE:        11/22/2022 06:11:00  PHYSICIAN:         Nasir Cristina MD                          OPERATIVE REPORT      DATE OF SURGERY:    11/22/2022 00:00:00    SURGEON:  Nasir Cristina MD    ANESTHESIA:  General.    PREOPERATIVE DIAGNOSIS:  Encounter for cosmetic surgery.    POSTOPERATIVE DIAGNOSIS:  Encounter for cosmetic surgery.    PROCEDURES PERFORMED:    1. Bilateral augmentation mammoplasty with silicone implants.  2. Bilateral liposuction anterior axillary folds.    INDICATION FOR PROCEDURE:  36-year-old female who presented to me desiring   cosmetic improvement in the size and shape of her breasts.  Options were   discussed with her and she elected to proceed with the above-listed procedures.    The risks, benefits, potential complications of surgery discussed in detail.    She understood that perfect symmetry is not possible.  She also understood the   risks of silicone implants.  She signed the informed consent.    DESCRIPTION OF PROCEDURE:  Patient identified in the preoperative holding area.    She was marked for a circum vertical augmentation mammoplasty.  She was taken   to the operating room, placed supine position.  General endotracheal anesthesia.    She was prepped and draped in sterile surgical fashion.  Time-out was taken   and everyone was in agreement.  Initially started the procedure by injecting   local anesthetic throughout the operative field.  After allowing time for   vasoconstriction, we then subsequently started on the patient's right breast.  I   made a vertical incision within the pattern.  There was continued dissection   down through the subcutaneous tissue.  Identified the pectoralis major muscle,    elevated it off the chest wall released its inferior IMF and sternal insertions   and subsequently did the exact same dissection on the patient's left side.    After this was accomplished, I then placed 310 cc silicone Allergan breast   implants using a Mo funnel into each pocket, after copiously irrigating out   the wounds with Betadine and normal saline as well as Stephen solution.  We also   re-prepped the patient and changed our gloves prior to placing implants using a   Mo funnel.  After this was accomplished, we then closed the breast implant   pockets, approximating deep subcutaneous tissue with 0 Vicryl sutures and then   approximated the deep dermis with 0 Vicryl sutures in interrupted fashion on   both sides.  After this was accomplished, I then evaluated the patient in the   upright position and performed a tailor tack to ensure that my markings were   correct on the right and left side.  They were correct.  I then subsequently   removed the staples and then we de-epithelialized the pattern on the right and   left sides.  Around the circular portion in a circular pattern on the right and   left sides, we then left a 5 mm shelf and undermined the dermis in this area   after incising it.  We then proceeded to close the vertical incisions with 0   Vicryl sutures in the dermis and 2-0 subcuticular Stratafix.  Circular pattern   was then spoke wheel CV-3 Marana-Devendra was then used.  These were   tied down after soaking in Betadine.  At the time of closure down to a 42 mm   template, which we preserved the nipple-areolar complex on.  3-0 Vicryl suture   was then used to approximate the dermis and then 3-0 subcuticular Monocryl.      Please note, we made a stab incision in each axillary area, performed   power-assisted liposuction after injecting local anesthetic in the field to   remove a small amount of adipose tissue in the anterior axillary fold.  These   incisions were closed with 3-0 Monocryl.  Sterile  dressings were applied.  We   did inject Exparel throughout the field.  There were no complications.  All   counts correct.        ______________________________  MD YAZMIN Kapoor/NADINE  DD:  11/22/2022  Time:  11:10AM  DT:  11/22/2022  Time:  11:34AM  Job #:  [id number]      OPERATIVE REPORT

## 2022-11-22 NOTE — DISCHARGE SUMMARY
[facility] Surgical - Periop Services  Discharge Note  Short Stay    Procedure(s) (LRB):  INSERTION, BREAST IMPLANT (CASH  //  Bilateral augmentation mastopexy with silicone breast implants, liposuction of bilateral axillary folds) (Bilateral)  MASTOPEXY (CASH  //  Bilateral augmentation mastopexy with silicone breast implants, liposuction of bilateral axillary folds) (Bilateral)  LIPOSUCTION (CASH  //  Bilateral augmentation mastopexy with silicone breast implants, liposuction of bilateral axillary folds) (Bilateral)      OUTCOME: Patient tolerated treatment/procedure well without complication and is now ready for discharge.    DISPOSITION: Home or Self Care    FINAL DIAGNOSIS:  <principal problem not specified>    FOLLOWUP: In clinic    DISCHARGE INSTRUCTIONS:  No discharge procedures on file.     TIME SPENT ON DISCHARGE:    5 minutes

## 2022-11-22 NOTE — PLAN OF CARE
Pt is aaox4-vss-pain improved to back and able to rest-aleksandra score 10. Meets phase2 criteria fpjasmin Galicia-to rm 6 via bed with reba

## 2022-11-22 NOTE — TRANSFER OF CARE
"Anesthesia Transfer of Care Note    Patient: Vonnie Batista    Procedure(s) Performed: Procedure(s) (LRB):  INSERTION, BREAST IMPLANT (CASH  //  Bilateral augmentation mastopexy with silicone breast implants, liposuction of bilateral axillary folds) (Bilateral)  MASTOPEXY (CASH  //  Bilateral augmentation mastopexy with silicone breast implants, liposuction of bilateral axillary folds) (Bilateral)  LIPOSUCTION (CASH  //  Bilateral augmentation mastopexy with silicone breast implants, liposuction of bilateral axillary folds) (Bilateral)    Patient location: PACU    Anesthesia Type: general    Transport from OR: Transported from OR on room air with adequate spontaneous ventilation    Post pain: adequate analgesia    Post assessment: no apparent anesthetic complications    Post vital signs: stable    Level of consciousness: responds to stimulation    Nausea/Vomiting: no nausea/vomiting    Complications: none    Transfer of care protocol was followed      Last vitals:   Visit Vitals  /77   Pulse (!) 59   Temp 36.9 °C (98.4 °F) (Oral)   Resp 18   Ht 5' 9" (1.753 m)   Wt 87.3 kg (192 lb 7.4 oz)   LMP 10/24/2022 (Approximate)   SpO2 99%   Breastfeeding No   BMI 28.42 kg/m²     "

## 2022-11-22 NOTE — DISCHARGE INSTRUCTIONS
Breast Reconstruction Discharge Instructions     What care is needed at home?   Sleep with your head and chest raised on 2 to 3 pillows to lower swelling.  Cough and take deep breaths to help keep your lungs clear.  Do not remove your dressing. Leave everything in place until your appointment -- Dr. Cristina will remove your dressings. Dermabond (skin glue) will peel off on its own. Do not pick at it.  Sponge bathe only until your follow-up appointment.   Do not lift anything over 10 pounds (4.5 kg)  Ask your doctor when you may go back to your normal activities like work, sex, or exercising  Avoid using creams until your skin has healed to lower your risk of infection or told by your doctor.  Wear your supportive/surgical bra at all times except when showering. Put on another open front supportive bra when washing the old one.   Tops that button up the front are easier to put on and take off than those that slip over your head.  Your bowel movements may take some time to get back to normal. Eat small meals high in fiber to avoid hard stools. Drink 6 to 8 glasses of water each day.  Try to walk each day. Start by walking a little more than you did the day before. Walking boosts blood flow and helps prevent lung, belly, and blood problems.  What follow-up care is needed?   Be sure to keep your follow up visit.  If you have a tissue expander, your doctor may need to fill the pouch with saline. Your doctor will set the schedule for follow-up.  If you had silicone gel implants, your doctor will check them every few years using ultrasound or MRI.  Your breasts implants may need to be replaced over time.  A second smaller surgery may need to be done. The surgery would work on fixing the size, shape, and color of your nipple.  Will physical activity be limited?   You will need to limit your activity for a while. Avoid lifting, sports, and sex until your doctor says physical activity is OK. Talk with your doctor about the right  amount of activity for you.  What problems could happen?   Bleeding  Infection  Scarring or wrinkled skin over the implant  Implant may make cancer harder to find  Breast sensation may not be the same as your natural breast  Leakage of implants  Problem with breastfeeding  When do I need to call the doctor?   Signs of infection, such as a fever of 100.4°F (38°C) or higher, chills, wound that will not heal.  Signs of wound infection, such as swelling, redness, warmth around the wound; too much pain when touched; yellowish, greenish, or bloody discharge; foul smell coming from the cut site; cut site opens up.  Cough, shortness of breath, chest pain  Upset stomach and throwing up that are not helped by nausea drugs  Pain that is not helped by pain drugs.  Patient Education       Liposuction   What happens after the procedure?   You may feel pain. Your doctor will give you drugs for this.  There will be swelling around the suctioned area. Your doctor will give you a compressive garment to wear over the area for support. You may need to wear this for 4 weeks or more.  There may also be bruising after the suctioned area. This is normal and will go away on its own after a few days.  What care is needed at home?   There may be bruising around the suctioned area.  The area may be numb or itchy. This will get better.  If the treatment was on your thighs or arms, raise them above the level of the heart when you sit or lie down.  Dermabond (skin glue) will peel off on its own. Do not pick at it.  Sponge bathe until released by Dr. Cristina Monday.   Do not lift anything over 10 pounds.  Ask your doctor when you may go back to your normal activities like work, sex, or driving.  Be sure to wash your hands before and after touching your wound or dressing.  What follow-up care is needed?   Be sure to keep your follow up visit.  If you plan to have liposuction on other body areas, you will have to wait 3 to 4 weeks.  Talk to your doctor  about creams that may stop scarring.  What lifestyle changes are needed?   Exercise. This will help firm the loose skin. Do this when the area is fully healed and when your doctor says it is safe to do so.  Talk to your doctor about the right amount of activity for you.  Avoid activities where you can easily get bumped. Ask your doctor about when and what type of exercise will be good for you to start.  What problems could happen?   Infection  Scarring  Outcome is not as successful as expected  Poor wound healing  Need for liposuction again to achieve the desired look  When do I need to call the doctor?   Signs of infection. These include a fever of 100.4°F (38°C) or higher, chills, wound that will not heal.  Signs of wound infection. These include swelling, redness, warmth around the wound; too much pain when touched; yellowish, greenish, or bloody discharge; foul smell coming from the cut site; cut site opens up.  Too much blood from the drain site  Very bad pain in the area even after you take the drugs for pain  Trouble breathing or chest pain  Very bad upset stomach and throwing up

## 2022-11-22 NOTE — ANESTHESIA PREPROCEDURE EVALUATION
11/22/2022  Vonnie Batista is a 36 y.o., female for insertion of breast implants.  She has a past medical history significant for degenerative disc disease and chronic pain s/p recent back surgery.  She states that she has been feeling well since, denies cardiopulmonary complaints.      Pre-op Assessment    I have reviewed the Patient Summary Reports.     I have reviewed the Nursing Notes. I have reviewed the NPO Status.   I have reviewed the Medications.     Review of Systems  Anesthesia Hx:  No problems with previous Anesthesia    Social:  Non-Smoker    Cardiovascular:   Exercise tolerance: good    Hepatic/GI:   GERD    Neurological:  Pain , location of R LBP/sciatica    Psych:   Psychiatric History          Physical Exam  General: Well nourished    Airway:  Mallampati: II / II  Mouth Opening: Small, but > 3cm  TM Distance: 4 - 6 cm  Tongue: Normal  Neck ROM: Normal ROM    Dental:  Intact    Chest/Lungs:  Clear to auscultation    Heart:  Rate: Normal        Anesthesia Plan  Type of Anesthesia, risks & benefits discussed:    Anesthesia Type: Gen ETT  Intra-op Monitoring Plan: Standard ASA Monitors  Post Op Pain Control Plan: multimodal analgesia  Induction:  IV  Airway Plan: Direct and Video  Informed Consent: Informed consent signed with the Patient and all parties understand the risks and agree with anesthesia plan.  All questions answered. Patient consented to blood products? Yes  ASA Score: 2  Day of Surgery Review of History & Physical: H&P Update referred to the surgeon/provider.    Ready For Surgery From Anesthesia Perspective.     .

## 2022-11-22 NOTE — PLAN OF CARE
Problem: Pain Acute  Goal: Acceptable Pain Control and Functional Ability  Outcome: Ongoing, Progressing     Problem: Fall Injury Risk  Goal: Absence of Fall and Fall-Related Injury  Outcome: Ongoing, Progressing     Problem: Infection  Goal: Absence of Infection Signs and Symptoms  Outcome: Ongoing, Progressing

## 2022-11-23 VITALS
DIASTOLIC BLOOD PRESSURE: 70 MMHG | SYSTOLIC BLOOD PRESSURE: 116 MMHG | RESPIRATION RATE: 16 BRPM | WEIGHT: 192.44 LBS | BODY MASS INDEX: 28.5 KG/M2 | HEIGHT: 69 IN | OXYGEN SATURATION: 93 % | TEMPERATURE: 98 F | HEART RATE: 66 BPM

## 2022-11-26 NOTE — ANESTHESIA POSTPROCEDURE EVALUATION
Anesthesia Post Evaluation    Patient: Vonnie Batista    Procedure(s) Performed: Procedure(s) (LRB):  INSERTION, BREAST IMPLANT (CASH  //  Bilateral augmentation mastopexy with silicone breast implants, liposuction of bilateral axillary folds) (Bilateral)  MASTOPEXY (CASH  //  Bilateral augmentation mastopexy with silicone breast implants, liposuction of bilateral axillary folds) (Bilateral)  LIPOSUCTION (CASH  //  Bilateral augmentation mastopexy with silicone breast implants, liposuction of bilateral axillary folds) (Bilateral)    Final Anesthesia Type: general      Patient location during evaluation: PACU  Patient participation: Yes- Able to Participate  Level of consciousness: awake and alert  Post-procedure vital signs: reviewed and stable  Pain management: adequate  Airway patency: patent      Anesthetic complications: no      Cardiovascular status: blood pressure returned to baseline  Respiratory status: unassisted  Hydration status: euvolemic  Follow-up not needed.          Vitals Value Taken Time   /70 11/22/22 1244   Temp 36.6 °C (97.9 °F) 11/22/22 1150   Pulse 66 11/22/22 1244   Resp 16 11/22/22 1159   SpO2 93 % 11/22/22 1244         Event Time   Out of Recovery 11:55:00         Pain/Elvis Score: No data recorded

## 2023-08-01 RX ORDER — NAPROXEN SODIUM 220 MG
220 TABLET ORAL
COMMUNITY

## 2023-08-02 ENCOUNTER — LAB VISIT (OUTPATIENT)
Dept: LAB | Facility: HOSPITAL | Age: 38
End: 2023-08-02
Attending: SURGERY
Payer: COMMERCIAL

## 2023-08-02 DIAGNOSIS — Z01.818 OTHER SPECIFIED PRE-OPERATIVE EXAMINATION: ICD-10-CM

## 2023-08-02 DIAGNOSIS — Z79.01 LONG TERM (CURRENT) USE OF ANTICOAGULANTS: ICD-10-CM

## 2023-08-02 DIAGNOSIS — Z01.818 OTHER SPECIFIED PRE-OPERATIVE EXAMINATION: Primary | ICD-10-CM

## 2023-08-02 LAB
APTT PPP: 24.8 SECONDS (ref 23.2–33.7)
BASOPHILS # BLD AUTO: 0.04 X10(3)/MCL
BASOPHILS NFR BLD AUTO: 0.9 %
EOSINOPHIL # BLD AUTO: 0.19 X10(3)/MCL (ref 0–0.9)
EOSINOPHIL NFR BLD AUTO: 4.2 %
ERYTHROCYTE [DISTWIDTH] IN BLOOD BY AUTOMATED COUNT: 15.3 % (ref 11.5–17)
HCT VFR BLD AUTO: 36.8 % (ref 37–47)
HGB BLD-MCNC: 11.4 G/DL (ref 12–16)
IMM GRANULOCYTES # BLD AUTO: 0.01 X10(3)/MCL (ref 0–0.04)
IMM GRANULOCYTES NFR BLD AUTO: 0.2 %
INR PPP: 0.9
LYMPHOCYTES # BLD AUTO: 1.89 X10(3)/MCL (ref 0.6–4.6)
LYMPHOCYTES NFR BLD AUTO: 42 %
MCH RBC QN AUTO: 24.9 PG (ref 27–31)
MCHC RBC AUTO-ENTMCNC: 31 G/DL (ref 33–36)
MCV RBC AUTO: 80.3 FL (ref 80–94)
MONOCYTES # BLD AUTO: 0.41 X10(3)/MCL (ref 0.1–1.3)
MONOCYTES NFR BLD AUTO: 9.1 %
NEUTROPHILS # BLD AUTO: 1.96 X10(3)/MCL (ref 2.1–9.2)
NEUTROPHILS NFR BLD AUTO: 43.6 %
NRBC BLD AUTO-RTO: 0 %
PLATELET # BLD AUTO: 370 X10(3)/MCL (ref 130–400)
PMV BLD AUTO: 10.1 FL (ref 7.4–10.4)
PROTHROMBIN TIME: 12.2 SECONDS (ref 12.5–14.5)
RBC # BLD AUTO: 4.58 X10(6)/MCL (ref 4.2–5.4)
WBC # SPEC AUTO: 4.5 X10(3)/MCL (ref 4.5–11.5)

## 2023-08-02 PROCEDURE — 36415 COLL VENOUS BLD VENIPUNCTURE: CPT

## 2023-08-02 PROCEDURE — 85730 THROMBOPLASTIN TIME PARTIAL: CPT

## 2023-08-02 PROCEDURE — 85025 COMPLETE CBC W/AUTO DIFF WBC: CPT

## 2023-08-02 PROCEDURE — 85610 PROTHROMBIN TIME: CPT

## 2023-08-09 ENCOUNTER — PATIENT MESSAGE (OUTPATIENT)
Dept: ADMINISTRATIVE | Facility: OTHER | Age: 38
End: 2023-08-09
Payer: COMMERCIAL

## 2023-08-09 NOTE — DISCHARGE INSTRUCTIONS
Patient Education       Breast Lift and Implant Discharge Instructions       What care is needed at home?   This is what you will need to know or do:  Take all your medications as ordered by your doctor.  Sleep with your head and chest elevated on 2 to 3 pillows to reduce swelling. Do not lay or sleep on your stomach until the doctor say otherwise.  How to care for your cut site:  Do not change your bandages. Keep surgical bra on 24 hours a day besides to wash it. Replace with another surgical bra while washing it.  Sponge bath until cleared by your doctor.  Be sure to wash your hands before and after touching your wound or dressing.  Do not use creams to lessen the scars and improve the look of your breasts. Avoid using creams, lotions, and oils until your incision or skin has healed. This will help to lower your risk of infection.  Do not lift, pull, or push anything over 10 pounds (4.5 kg).  Ask the doctor when you may go back to your normal activities like work or exercise.  Tops that button up the front are easier to put on and take off than those that slip over your head.  Your bowel movements may take some time to get back to normal. Eat small meals high in fiber to avoid hard stools. Drink 6 to 8 glasses of water each day. You may take an over the counter stool softener.  Try to walk each day. Start by walking a little more than you did the day before. Walking boosts blood flow and helps prevent lung, belly, and blood problems.  Do not stay in the sun for prolonged periods, no tanning beds, or saunas.     What follow-up care is needed?   Be sure to keep your follow-up visit.  Any drains placed in your breasts will be removed 2 to 4 days after surgery.  If you have stitches or staples, you will need to have them taken out. Your doctor will often want to do this in 1 to 2 weeks.   If you had silicone gel implants, your doctor will check them every few years using ultrasound or MRI.   Your breasts implants may  need to be replaced over time.    Will physical activity be limited?   You will need to limit your activity for a while. Avoid lifting, sports, and sex until your doctor says it is ok to do these things. Talk with your doctor about the right amount of activity for you.    What problems could happen?   Bleeding  Infection  Scarring or wrinkled skin over the implant  Implant may make cancer harder to detect  Breast sensation may not be the same as your natural breast  Leakage of implants  Problem with breastfeeding    When do I need to call the doctor?   Signs of infection. These include a fever of 100.4°F (38°C) or higher, chills, wound that will not heal.  Signs of wound infection. These include swelling, redness, warmth around the wound; too much pain when touched; yellowish, greenish, or bloody discharge; foul smell coming from the cut site; cut site opens up.  Drain seems clogged and there is fluid under your surgery cuts  Cough, shortness of breath, chest pain  Upset stomach and throwing up that are not helped by pain drugs

## 2023-08-10 ENCOUNTER — HOSPITAL ENCOUNTER (OUTPATIENT)
Facility: HOSPITAL | Age: 38
Discharge: HOME | End: 2023-08-10
Attending: SURGERY | Admitting: SURGERY

## 2023-08-10 ENCOUNTER — ANESTHESIA EVENT (OUTPATIENT)
Dept: SURGERY | Facility: HOSPITAL | Age: 38
End: 2023-08-10

## 2023-08-10 ENCOUNTER — ANESTHESIA (OUTPATIENT)
Dept: SURGERY | Facility: HOSPITAL | Age: 38
End: 2023-08-10

## 2023-08-10 VITALS
HEART RATE: 93 BPM | HEIGHT: 69 IN | RESPIRATION RATE: 18 BRPM | DIASTOLIC BLOOD PRESSURE: 73 MMHG | TEMPERATURE: 98 F | BODY MASS INDEX: 30.21 KG/M2 | OXYGEN SATURATION: 97 % | SYSTOLIC BLOOD PRESSURE: 123 MMHG | WEIGHT: 204 LBS

## 2023-08-10 LAB
B-HCG UR QL: NEGATIVE
CTP QC/QA: YES

## 2023-08-10 PROCEDURE — 63600175 PHARM REV CODE 636 W HCPCS: Performed by: SURGERY

## 2023-08-10 PROCEDURE — 15777 PR ACELLULAR DERM MATRIX IMPLT: ICD-10-PCS | Mod: 50,CSM,, | Performed by: SURGERY

## 2023-08-10 PROCEDURE — A4216 STERILE WATER/SALINE, 10 ML: HCPCS | Performed by: SURGERY

## 2023-08-10 PROCEDURE — 19380 REVJ RECONSTRUCTED BREAST: CPT | Mod: 50,CSM,, | Performed by: SURGERY

## 2023-08-10 PROCEDURE — D9220A PRA ANESTHESIA: ICD-10-PCS | Mod: CSM,,, | Performed by: ANESTHESIOLOGY

## 2023-08-10 PROCEDURE — 25000003 PHARM REV CODE 250: Performed by: NURSE ANESTHETIST, CERTIFIED REGISTERED

## 2023-08-10 PROCEDURE — D9220A PRA ANESTHESIA: Mod: CSM,,, | Performed by: ANESTHESIOLOGY

## 2023-08-10 PROCEDURE — 37000008 HC ANESTHESIA 1ST 15 MINUTES: Performed by: SURGERY

## 2023-08-10 PROCEDURE — 15777 ACELLULAR DERM MATRIX IMPLT: CPT | Mod: 50,CSM,, | Performed by: SURGERY

## 2023-08-10 PROCEDURE — 37000009 HC ANESTHESIA EA ADD 15 MINS: Performed by: SURGERY

## 2023-08-10 PROCEDURE — 36000707: Performed by: SURGERY

## 2023-08-10 PROCEDURE — 71000033 HC RECOVERY, INTIAL HOUR: Performed by: SURGERY

## 2023-08-10 PROCEDURE — C1713 ANCHOR/SCREW BN/BN,TIS/BN: HCPCS | Performed by: SURGERY

## 2023-08-10 PROCEDURE — 63600175 PHARM REV CODE 636 W HCPCS

## 2023-08-10 PROCEDURE — 71000039 HC RECOVERY, EACH ADD'L HOUR: Performed by: SURGERY

## 2023-08-10 PROCEDURE — 81025 URINE PREGNANCY TEST: CPT | Performed by: ANESTHESIOLOGY

## 2023-08-10 PROCEDURE — 63600175 PHARM REV CODE 636 W HCPCS: Performed by: NURSE ANESTHETIST, CERTIFIED REGISTERED

## 2023-08-10 PROCEDURE — 71000016 HC POSTOP RECOV ADDL HR: Performed by: SURGERY

## 2023-08-10 PROCEDURE — 25000003 PHARM REV CODE 250: Performed by: SURGERY

## 2023-08-10 PROCEDURE — 63600175 PHARM REV CODE 636 W HCPCS: Performed by: ANESTHESIOLOGY

## 2023-08-10 PROCEDURE — 19380 PR REVISE BREAST RECONSTRUCTION: ICD-10-PCS | Mod: 50,CSM,, | Performed by: SURGERY

## 2023-08-10 PROCEDURE — 71000015 HC POSTOP RECOV 1ST HR: Performed by: SURGERY

## 2023-08-10 PROCEDURE — 36000706: Performed by: SURGERY

## 2023-08-10 RX ORDER — SODIUM CHLORIDE 9 MG/ML
INJECTION, SOLUTION INTRAMUSCULAR; INTRAVENOUS; SUBCUTANEOUS
Status: DISCONTINUED
Start: 2023-08-10 | End: 2023-08-10 | Stop reason: HOSPADM

## 2023-08-10 RX ORDER — SODIUM CHLORIDE 0.9 % (FLUSH) 0.9 %
SYRINGE (ML) INJECTION
Status: DISCONTINUED | OUTPATIENT
Start: 2023-08-10 | End: 2023-08-10 | Stop reason: HOSPADM

## 2023-08-10 RX ORDER — MEPERIDINE HYDROCHLORIDE 25 MG/ML
12.5 INJECTION INTRAMUSCULAR; INTRAVENOUS; SUBCUTANEOUS ONCE AS NEEDED
Status: DISCONTINUED | OUTPATIENT
Start: 2023-08-10 | End: 2023-08-10

## 2023-08-10 RX ORDER — SODIUM CHLORIDE 9 MG/ML
INJECTION, SOLUTION INTRAVENOUS CONTINUOUS
Status: DISCONTINUED | OUTPATIENT
Start: 2023-08-10 | End: 2023-08-10 | Stop reason: HOSPADM

## 2023-08-10 RX ORDER — GENTAMICIN SULFATE 40 MG/ML
INJECTION, SOLUTION INTRAMUSCULAR; INTRAVENOUS
Status: DISCONTINUED
Start: 2023-08-10 | End: 2023-08-10 | Stop reason: HOSPADM

## 2023-08-10 RX ORDER — METHOCARBAMOL 100 MG/ML
1000 INJECTION, SOLUTION INTRAMUSCULAR; INTRAVENOUS ONCE
Status: COMPLETED | OUTPATIENT
Start: 2023-08-10 | End: 2023-08-10

## 2023-08-10 RX ORDER — MIDAZOLAM HYDROCHLORIDE 1 MG/ML
2 INJECTION INTRAMUSCULAR; INTRAVENOUS ONCE AS NEEDED
Status: COMPLETED | OUTPATIENT
Start: 2023-08-10 | End: 2023-08-10

## 2023-08-10 RX ORDER — METHOCARBAMOL 100 MG/ML
1000 INJECTION, SOLUTION INTRAMUSCULAR; INTRAVENOUS ONCE
Status: DISCONTINUED | OUTPATIENT
Start: 2023-08-10 | End: 2023-08-10

## 2023-08-10 RX ORDER — CYCLOBENZAPRINE HCL 10 MG
10 TABLET ORAL 3 TIMES DAILY PRN
Status: DISCONTINUED | OUTPATIENT
Start: 2023-08-10 | End: 2023-08-10 | Stop reason: HOSPADM

## 2023-08-10 RX ORDER — SODIUM CITRATE AND CITRIC ACID MONOHYDRATE 334; 500 MG/5ML; MG/5ML
30 SOLUTION ORAL
Status: CANCELLED | OUTPATIENT
Start: 2023-08-10

## 2023-08-10 RX ORDER — ACETAMINOPHEN 10 MG/ML
INJECTION, SOLUTION INTRAVENOUS
Status: DISCONTINUED
Start: 2023-08-10 | End: 2023-08-10 | Stop reason: HOSPADM

## 2023-08-10 RX ORDER — HYDROMORPHONE HYDROCHLORIDE 2 MG/ML
INJECTION, SOLUTION INTRAMUSCULAR; INTRAVENOUS; SUBCUTANEOUS
Status: DISCONTINUED | OUTPATIENT
Start: 2023-08-10 | End: 2023-08-10

## 2023-08-10 RX ORDER — ONDANSETRON 2 MG/ML
4 INJECTION INTRAMUSCULAR; INTRAVENOUS EVERY 12 HOURS PRN
Status: DISCONTINUED | OUTPATIENT
Start: 2023-08-10 | End: 2023-08-10 | Stop reason: HOSPADM

## 2023-08-10 RX ORDER — FENTANYL CITRATE 50 UG/ML
INJECTION, SOLUTION INTRAMUSCULAR; INTRAVENOUS
Status: DISCONTINUED | OUTPATIENT
Start: 2023-08-10 | End: 2023-08-10

## 2023-08-10 RX ORDER — MORPHINE SULFATE 4 MG/ML
2 INJECTION, SOLUTION INTRAMUSCULAR; INTRAVENOUS EVERY 4 HOURS PRN
Status: DISCONTINUED | OUTPATIENT
Start: 2023-08-10 | End: 2023-08-10 | Stop reason: HOSPADM

## 2023-08-10 RX ORDER — PROCHLORPERAZINE EDISYLATE 5 MG/ML
5 INJECTION INTRAMUSCULAR; INTRAVENOUS EVERY 6 HOURS PRN
Status: DISCONTINUED | OUTPATIENT
Start: 2023-08-10 | End: 2023-08-10 | Stop reason: HOSPADM

## 2023-08-10 RX ORDER — GLYCOPYRROLATE 0.2 MG/ML
INJECTION INTRAMUSCULAR; INTRAVENOUS
Status: DISCONTINUED | OUTPATIENT
Start: 2023-08-10 | End: 2023-08-10

## 2023-08-10 RX ORDER — HYDROMORPHONE HYDROCHLORIDE 2 MG/ML
0.4 INJECTION, SOLUTION INTRAMUSCULAR; INTRAVENOUS; SUBCUTANEOUS EVERY 5 MIN PRN
Status: DISCONTINUED | OUTPATIENT
Start: 2023-08-10 | End: 2023-08-10

## 2023-08-10 RX ORDER — CEFAZOLIN SODIUM 1 G/3ML
2 INJECTION, POWDER, FOR SOLUTION INTRAMUSCULAR; INTRAVENOUS
Status: COMPLETED | OUTPATIENT
Start: 2023-08-10 | End: 2023-08-10

## 2023-08-10 RX ORDER — BUPIVACAINE HYDROCHLORIDE 5 MG/ML
INJECTION, SOLUTION EPIDURAL; INTRACAUDAL
Status: DISCONTINUED
Start: 2023-08-10 | End: 2023-08-10 | Stop reason: HOSPADM

## 2023-08-10 RX ORDER — CEFAZOLIN SODIUM 2 G/50ML
2 SOLUTION INTRAVENOUS
Status: DISCONTINUED | OUTPATIENT
Start: 2023-08-10 | End: 2023-08-10 | Stop reason: HOSPADM

## 2023-08-10 RX ORDER — ONDANSETRON 4 MG/1
4 TABLET, ORALLY DISINTEGRATING ORAL EVERY 6 HOURS PRN
Status: CANCELLED | OUTPATIENT
Start: 2023-08-10

## 2023-08-10 RX ORDER — EPHEDRINE SULFATE 50 MG/ML
INJECTION, SOLUTION INTRAVENOUS
Status: DISCONTINUED | OUTPATIENT
Start: 2023-08-10 | End: 2023-08-10

## 2023-08-10 RX ORDER — LIDOCAINE HYDROCHLORIDE 10 MG/ML
INJECTION, SOLUTION EPIDURAL; INFILTRATION; INTRACAUDAL; PERINEURAL
Status: DISCONTINUED | OUTPATIENT
Start: 2023-08-10 | End: 2023-08-10

## 2023-08-10 RX ORDER — CEFAZOLIN SODIUM 1 G/3ML
INJECTION, POWDER, FOR SOLUTION INTRAMUSCULAR; INTRAVENOUS
Status: COMPLETED
Start: 2023-08-10 | End: 2023-08-10

## 2023-08-10 RX ORDER — SODIUM CHLORIDE, SODIUM LACTATE, POTASSIUM CHLORIDE, CALCIUM CHLORIDE 600; 310; 30; 20 MG/100ML; MG/100ML; MG/100ML; MG/100ML
INJECTION, SOLUTION INTRAVENOUS CONTINUOUS
Status: CANCELLED | OUTPATIENT
Start: 2023-08-10

## 2023-08-10 RX ORDER — LIDOCAINE HYDROCHLORIDE 10 MG/ML
1 INJECTION, SOLUTION EPIDURAL; INFILTRATION; INTRACAUDAL; PERINEURAL ONCE
Status: CANCELLED | OUTPATIENT
Start: 2023-08-10 | End: 2023-08-10

## 2023-08-10 RX ORDER — ACETAMINOPHEN 10 MG/ML
INJECTION, SOLUTION INTRAVENOUS
Status: DISCONTINUED | OUTPATIENT
Start: 2023-08-10 | End: 2023-08-10

## 2023-08-10 RX ORDER — ONDANSETRON HYDROCHLORIDE 2 MG/ML
INJECTION, SOLUTION INTRAMUSCULAR; INTRAVENOUS
Status: DISCONTINUED | OUTPATIENT
Start: 2023-08-10 | End: 2023-08-10

## 2023-08-10 RX ORDER — OXYCODONE AND ACETAMINOPHEN 5; 325 MG/1; MG/1
2 TABLET ORAL EVERY 4 HOURS PRN
Status: DISCONTINUED | OUTPATIENT
Start: 2023-08-10 | End: 2023-08-10 | Stop reason: HOSPADM

## 2023-08-10 RX ORDER — CEFAZOLIN SODIUM 1 G/3ML
INJECTION, POWDER, FOR SOLUTION INTRAMUSCULAR; INTRAVENOUS
Status: DISCONTINUED | OUTPATIENT
Start: 2023-08-10 | End: 2023-08-10 | Stop reason: HOSPADM

## 2023-08-10 RX ORDER — BUPIVACAINE HYDROCHLORIDE 5 MG/ML
INJECTION, SOLUTION EPIDURAL; INTRACAUDAL
Status: DISCONTINUED | OUTPATIENT
Start: 2023-08-10 | End: 2023-08-10 | Stop reason: HOSPADM

## 2023-08-10 RX ORDER — PROPOFOL 10 MG/ML
VIAL (ML) INTRAVENOUS
Status: DISCONTINUED | OUTPATIENT
Start: 2023-08-10 | End: 2023-08-10

## 2023-08-10 RX ORDER — ACETAMINOPHEN 10 MG/ML
1000 INJECTION, SOLUTION INTRAVENOUS ONCE
Status: DISCONTINUED | OUTPATIENT
Start: 2023-08-10 | End: 2023-08-10

## 2023-08-10 RX ORDER — ROCURONIUM BROMIDE 10 MG/ML
INJECTION, SOLUTION INTRAVENOUS
Status: DISCONTINUED | OUTPATIENT
Start: 2023-08-10 | End: 2023-08-10

## 2023-08-10 RX ORDER — SODIUM CHLORIDE, SODIUM LACTATE, POTASSIUM CHLORIDE, CALCIUM CHLORIDE 600; 310; 30; 20 MG/100ML; MG/100ML; MG/100ML; MG/100ML
INJECTION, SOLUTION INTRAVENOUS CONTINUOUS
Status: DISCONTINUED | OUTPATIENT
Start: 2023-08-10 | End: 2023-08-10

## 2023-08-10 RX ORDER — GENTAMICIN SULFATE 40 MG/ML
INJECTION, SOLUTION INTRAMUSCULAR; INTRAVENOUS
Status: DISCONTINUED | OUTPATIENT
Start: 2023-08-10 | End: 2023-08-10 | Stop reason: HOSPADM

## 2023-08-10 RX ORDER — HYDROMORPHONE HYDROCHLORIDE 2 MG/ML
0.2 INJECTION, SOLUTION INTRAMUSCULAR; INTRAVENOUS; SUBCUTANEOUS EVERY 5 MIN PRN
Status: DISCONTINUED | OUTPATIENT
Start: 2023-08-10 | End: 2023-08-10

## 2023-08-10 RX ORDER — MIDAZOLAM HYDROCHLORIDE 1 MG/ML
INJECTION INTRAMUSCULAR; INTRAVENOUS
Status: COMPLETED
Start: 2023-08-10 | End: 2023-08-10

## 2023-08-10 RX ORDER — DEXAMETHASONE SODIUM PHOSPHATE 4 MG/ML
INJECTION, SOLUTION INTRA-ARTICULAR; INTRALESIONAL; INTRAMUSCULAR; INTRAVENOUS; SOFT TISSUE
Status: DISCONTINUED | OUTPATIENT
Start: 2023-08-10 | End: 2023-08-10

## 2023-08-10 RX ADMIN — EPHEDRINE SULFATE 15 MG: 50 INJECTION INTRAVENOUS at 07:08

## 2023-08-10 RX ADMIN — HYDROMORPHONE HYDROCHLORIDE 0.4 MG: 2 INJECTION INTRAMUSCULAR; INTRAVENOUS; SUBCUTANEOUS at 12:08

## 2023-08-10 RX ADMIN — FENTANYL CITRATE 25 MCG: 50 INJECTION, SOLUTION INTRAMUSCULAR; INTRAVENOUS at 08:08

## 2023-08-10 RX ADMIN — MIDAZOLAM HYDROCHLORIDE 2 MG: 1 INJECTION INTRAMUSCULAR; INTRAVENOUS at 06:08

## 2023-08-10 RX ADMIN — OXYCODONE HYDROCHLORIDE AND ACETAMINOPHEN 2 TABLET: 5; 325 TABLET ORAL at 02:08

## 2023-08-10 RX ADMIN — EPHEDRINE SULFATE 10 MG: 50 INJECTION INTRAVENOUS at 09:08

## 2023-08-10 RX ADMIN — ACETAMINOPHEN 1000 MG: 10 INJECTION, SOLUTION INTRAVENOUS at 10:08

## 2023-08-10 RX ADMIN — ROCURONIUM BROMIDE 50 MG: 50 INJECTION INTRAVENOUS at 07:08

## 2023-08-10 RX ADMIN — PROPOFOL 50 MG: 10 INJECTION, EMULSION INTRAVENOUS at 08:08

## 2023-08-10 RX ADMIN — CEFAZOLIN 2 G: 330 INJECTION, POWDER, FOR SOLUTION INTRAMUSCULAR; INTRAVENOUS at 07:08

## 2023-08-10 RX ADMIN — HYDROMORPHONE HYDROCHLORIDE 0.2 MG: 2 INJECTION, SOLUTION INTRAMUSCULAR; INTRAVENOUS; SUBCUTANEOUS at 11:08

## 2023-08-10 RX ADMIN — SODIUM CHLORIDE, POTASSIUM CHLORIDE, SODIUM LACTATE AND CALCIUM CHLORIDE: 600; 310; 30; 20 INJECTION, SOLUTION INTRAVENOUS at 06:08

## 2023-08-10 RX ADMIN — FENTANYL CITRATE 75 MCG: 50 INJECTION, SOLUTION INTRAMUSCULAR; INTRAVENOUS at 07:08

## 2023-08-10 RX ADMIN — SUGAMMADEX 200 MG: 100 INJECTION, SOLUTION INTRAVENOUS at 10:08

## 2023-08-10 RX ADMIN — SODIUM CHLORIDE, POTASSIUM CHLORIDE, SODIUM LACTATE AND CALCIUM CHLORIDE: 600; 310; 30; 20 INJECTION, SOLUTION INTRAVENOUS at 08:08

## 2023-08-10 RX ADMIN — HYDROMORPHONE HYDROCHLORIDE 0.4 MG: 2 INJECTION INTRAMUSCULAR; INTRAVENOUS; SUBCUTANEOUS at 11:08

## 2023-08-10 RX ADMIN — GLYCOPYRROLATE 0.2 MG: 0.2 INJECTION INTRAMUSCULAR; INTRAVENOUS at 07:08

## 2023-08-10 RX ADMIN — METHOCARBAMOL 1000 MG: 100 INJECTION INTRAMUSCULAR; INTRAVENOUS at 11:08

## 2023-08-10 RX ADMIN — HYDROMORPHONE HYDROCHLORIDE 0.4 MG: 2 INJECTION, SOLUTION INTRAMUSCULAR; INTRAVENOUS; SUBCUTANEOUS at 08:08

## 2023-08-10 RX ADMIN — LIDOCAINE HYDROCHLORIDE 50 MG: 10 INJECTION, SOLUTION EPIDURAL; INFILTRATION; INTRACAUDAL; PERINEURAL at 07:08

## 2023-08-10 RX ADMIN — DEXAMETHASONE SODIUM PHOSPHATE 8 MG: 4 INJECTION, SOLUTION INTRA-ARTICULAR; INTRALESIONAL; INTRAMUSCULAR; INTRAVENOUS; SOFT TISSUE at 07:08

## 2023-08-10 RX ADMIN — PROPOFOL 200 MG: 10 INJECTION, EMULSION INTRAVENOUS at 07:08

## 2023-08-10 RX ADMIN — SODIUM CHLORIDE, POTASSIUM CHLORIDE, SODIUM LACTATE AND CALCIUM CHLORIDE: 600; 310; 30; 20 INJECTION, SOLUTION INTRAVENOUS at 11:08

## 2023-08-10 RX ADMIN — MIDAZOLAM HYDROCHLORIDE 2 MG: 1 INJECTION, SOLUTION INTRAMUSCULAR; INTRAVENOUS at 06:08

## 2023-08-10 RX ADMIN — ONDANSETRON 4 MG: 2 INJECTION INTRAMUSCULAR; INTRAVENOUS at 10:08

## 2023-08-10 NOTE — PROGRESS NOTES
Op site dressing noted, surrounding area soft and warm to the touch, color WDL.  Surgical bra noted over dressings

## 2023-08-10 NOTE — ANESTHESIA PREPROCEDURE EVALUATION
"                                                                                                             08/10/2023  Vonnie Batista is a 37 y.o., female.      Pre-op Assessment    I have reviewed the Patient Summary Reports.     I have reviewed the Nursing Notes. I have reviewed the NPO Status.   I have reviewed the Medications.     Review of Systems      Physical Exam  General: Well nourished and Cooperative    Airway:  Mallampati: II   Mouth Opening: Normal  TM Distance: Normal  Tongue: Normal  Neck ROM: Normal ROM    Chest/Lungs:  Clear to auscultation    Heart:  Rate: Normal        Anesthesia Plan  Type of Anesthesia, risks & benefits discussed:    Anesthesia Type: Gen ETT  Intra-op Monitoring Plan: Standard ASA Monitors  Post Op Pain Control Plan: multimodal analgesia  Induction:  IV  Informed Consent: Informed consent signed with the Patient and all parties understand the risks and agree with anesthesia plan.  All questions answered.   ASA Score: 2  Day of Surgery Review of History & Physical: H&P Update referred to the surgeon/provider.    Ready For Surgery From Anesthesia Perspective.     .  I explained anesthesia plan to patient/responsbile party if available.  Anesthesia consent done going over the material facts, risks, complications & alternatives, obtained which includes the possibility of altering the anesthesia plan.  I reviewed problem list, appropriate labs, any workup, Xray, EKG etc noted below.  Patients condition is satisfactory to proceed with anesthesia plan unless otherwise noted (see anesthesia chart for details of the anesthesia plan carried out).      Pre-operative evaluation for Procedure(s) (LRB):  MASTOPEXY (CASH //  Bilateral mastopexy REVISION with placement of galaflex mesh) (Bilateral)    /78   Pulse (!) 51   Temp 36.7 °C (98 °F) (Oral)   Resp 16   Ht 5' 9" (1.753 m)   Wt 92.5 kg (204 lb)   LMP 08/01/2023 (Exact Date)   SpO2 99%   Breastfeeding No   BMI 30.13 " kg/m²     Patient Active Problem List   Diagnosis    Lumbar disc herniation       Review of patient's allergies indicates:  No Known Allergies    Current Outpatient Medications   Medication Instructions    albuterol (PROVENTIL/VENTOLIN HFA) 90 mcg/actuation inhaler SMARTSI-2 Puff(s) Via Inhaler Every 4 Hours PRN    buPROPion (WELLBUTRIN XL) 150 mg, Oral, Daily    celecoxib (CELEBREX) 200 MG capsule celecoxib 200 mg capsule    cyclobenzaprine (FLEXERIL) 10 mg, Oral, Nightly PRN    DULoxetine 20 mg, Oral, Daily    hydrOXYchloroQUINE (PLAQUENIL) 400 mg, Oral, Daily    methocarbamoL (ROBAXIN) 500 mg, Oral, Every 6 hours PRN    naproxen sodium (ANAPROX) 220 mg, Oral, As needed (PRN)    oxyCODONE-acetaminophen (PERCOCET) 5-325 mg per tablet 1 tablet, Oral, Every 6 hours PRN    pantoprazole (PROTONIX) 40 mg, Oral, Daily    SLYND 4 mg (28) Tab 1 tablet, Oral, Daily       Past Surgical History:   Procedure Laterality Date    CHOLECYSTECTOMY      INSERTION OF BREAST IMPLANT Bilateral 2022    Procedure: INSERTION, BREAST IMPLANT (CASH  //  Bilateral augmentation mastopexy with silicone breast implants, liposuction of bilateral axillary folds);  Surgeon: Nasir Cristina MD;  Location: [facility] OR;  Service: Plastics;  Laterality: Bilateral;    LAPAROSCOPIC GASTRIC BANDING      LIPOSUCTION Bilateral 2022    Procedure: LIPOSUCTION (CASH  //  Bilateral augmentation mastopexy with silicone breast implants, liposuction of bilateral axillary folds);  Surgeon: Nasir Cristina MD;  Location: [facility] OR;  Service: Plastics;  Laterality: Bilateral;    MASTOPEXY Bilateral 2022    Procedure: MASTOPEXY (CASH  //  Bilateral augmentation mastopexy with silicone breast implants, liposuction of bilateral axillary folds);  Surgeon: Nasir Cristina MD;  Location: [facility] OR;  Service: Plastics;  Laterality: Bilateral;    MINIMALLY INVASIVE SURGICAL REMOVAL OF INTERVERTEBRAL DISC OF SPINE USING MICROSCOPE Left 10/05/2022  "   Procedure: DISCECTOMY, SPINE, MINIMALLY INVASIVE, USING MICROSCOPE;  Surgeon: Dash Grace MD;  [facility];  Service: Neurosurgery;  Laterality: Left;  LEFT L4/5  (MIS)  FAR LATERAL DISCECTOMY  //  PRONE // PRONE REEMA // DRILL // SCOPE // NDM    TONSILLECTOMY         Social History     Socioeconomic History    Marital status:    Tobacco Use    Smoking status: Former     Current packs/day: 0.00     Types: Cigarettes    Smokeless tobacco: Never   Substance and Sexual Activity    Alcohol use: Yes     Alcohol/week: 1.0 standard drink of alcohol     Types: 1 Glasses of wine per week     Comment: twice a year    Drug use: Never    Sexual activity: Yes     Partners: Male       Lab Results   Component Value Date    WBC 4.50 08/02/2023    HGB 11.4 (L) 08/02/2023    HCT 36.8 (L) 08/02/2023    MCV 80.3 08/02/2023     08/02/2023          BMP  Lab Results   Component Value Date    HCT 36.8 (L) 08/02/2023     10/04/2022    K 3.8 10/04/2022    BUN 15.0 10/04/2022    CREATININE 0.70 10/04/2022    CALCIUM 9.5 10/04/2022        INR  No results for input(s): "PT", "INR", "PROTIME", "APTT" in the last 72 hours.        Diagnostic Studies:      EKG:  No results found for this or any previous visit.         " Rinvoq Pregnancy And Lactation Text: Based on animal studies, Rinvoq may cause embryo-fetal harm when administered to pregnant women.  The medication should not be used in pregnancy.  Breastfeeding is not recommended during treatment and for 6 days after the last dose.

## 2023-08-10 NOTE — ANESTHESIA PROCEDURE NOTES
Intubation    Date/Time: 8/10/2023 7:13 AM    Performed by: Sue Kenny CRNA  Authorized by: Bogdan Ferris MD    Intubation:     Induction:  Intravenous    Intubated:  Postinduction    Mask Ventilation:  Easy mask    Attempts:  1    Attempted By:  CRNA    Method of Intubation:  Direct    Blade:  Forrest 2    Laryngeal View Grade: Grade I - full view of cords      Difficult Airway Encountered?: No      Complications:  None    Airway Device:  Oral endotracheal tube    Airway Device Size:  7.0    Style/Cuff Inflation:  Cuffed (inflated to minimal occlusive pressure)    Tube secured:  22    Secured at:  The lips    Placement Verified By:  Capnometry    Complicating Factors:  Anterior larynx    Findings Post-Intubation:  BS equal bilateral and atraumatic/condition of teeth unchanged

## 2023-08-10 NOTE — TRANSFER OF CARE
Anesthesia Transfer of Care Note    Patient: Vonnie Batista    Procedure(s) Performed: Procedure(s) (LRB):  MASTOPEXY (CASH //  Bilateral mastopexy REVISION with placement of galaflex mesh) (Bilateral)    Patient location: PACU    Anesthesia Type: general    Transport from OR: Transported from OR on room air with adequate spontaneous ventilation    Post pain: adequate analgesia    Post assessment: no apparent anesthetic complications    Post vital signs: stable    Level of consciousness: sedated    Nausea/Vomiting: no nausea/vomiting    Complications: none    Transfer of care protocol was followed      Last vitals:

## 2023-08-10 NOTE — ANESTHESIA POSTPROCEDURE EVALUATION
Anesthesia Post Evaluation    Patient: Vonnie Batista    Procedure(s) Performed: Procedure(s) (LRB):  MASTOPEXY (CASH //  Bilateral mastopexy REVISION with placement of galaflex mesh) (Bilateral)    Final Anesthesia Type: general (/Regional//MAC)      Patient location during evaluation: PACU  Post-procedure mental status: @ basline.  Pain management: adequate    PONV status: See postop meds for drugs used to control n/v if any.  Anesthetic complications: no      Cardiovascular status: blood pressure returned to baseline  Respiratory status: @ baseline.  Hydration status: euvolemic            Vitals Value Taken Time   /64 08/10/23 1130   Temp  08/10/23 1131   Pulse 72 08/10/23 1131   Resp 28 08/10/23 1131   SpO2 100 % 08/10/23 1131   Vitals shown include unvalidated device data.      No case tracking events are documented in the log.      Pain/Elvis Score: No data recorded

## 2023-08-10 NOTE — OP NOTE
[facility] [address] [place] [address]    PATIENT NAME:      GHISLAINE ROBLES   YOB: 1985  CSN:               [id number]  MRN:               [id number]  ADMIT DATE:        08/10/2023 05:33:00  PHYSICIAN:         Nasir Cristina MD                          OPERATIVE REPORT      DATE OF SURGERY:    08/10/2023 00:00:00    SURGEON:  Nasir Cristina MD    PREOPERATIVE DIAGNOSIS:  Cosmetic surgery.    POSTOPERATIVE DIAGNOSIS:  Cosmetic surgery.    PROCEDURE:    1. Revision bilateral mastopexy.  2. Insertion of GalaFLEX mesh to both breasts.    INDICATIONS FOR PROCEDURE:  This is a 37-year-old female.  She had an   augmentation mastopexy performed in November of last year.  She did well in the   initial postoperative period.  However over time, she had redeveloped some   ptosis and some bottoming out of her breast implants.  She also had some   lateralization of her nipples.  Options were discussed with her and she elected   to proceed with a revision mastopexy and placement of GalaFLEX mesh.  Risks,   benefits, potential complications of surgery discussed in detail.  She did   understand I could not completely medialize the nipple-areolar complexes and   that perfect symmetry could not be obtained, that I would try my best.  We   discussed the implant related complications in detail as well as scarring,   infection, palpability of the GalaFLEX mesh in detail.    DESCRIPTION OF PROCEDURE:  The patient was identified in the preoperative   holding area.  Informed consent reviewed with her.  She was taken to the   operating room, placed in supine position.  General endotracheal anesthesia was   provided.  She was prepped and draped in sterile surgical fashion.  Time-out was   taken and in agreement.  She initially had a circumvertical bilateral mastopexy   augmentation.  I initially started on the  patient's right breast and made an   incision through her previous vertical incision, taken down through the   subcutaneous tissue, opened the breast implant capsule.  She had an intact 310   mL silicone implant.  This was placed in the Betadine bath for later   replacement.  Evaluated her capsule, was noted to be very normal.  I then   obtained a medium size piece of GalaFLEX 3D contour mesh and this was soaked in   Betadine and Bello solution and then placed it within the pocket in the   appropriate position where I wanted to elevate the implant.  I secured this down   to the chest wall using the tabs with 2-0 PDS suture and then I also re-placed   the implant using a Mo funnel back into the pocket in the appropriate   position.  Next, further secured the superior tabs with 2-0 PDS sutures over the   implant.  This appeared to elevate the implant appropriately.  I then   subsequently closed the defect where the incision was with 0 Vicryl sutures in   the deep subcutaneous tissue and also in the deep dermis and then I performed   the exact same procedure on the patient's left breast with similarly incised   vertical incision.  We used electrocautery, elevate down to the capsule of the   capsule.  Once the implant placed in Betadine solution and also capsule was   noted to be normal on the left side as well, and then also used a medium-size   piece of GalaFLEX that was soaked in Betadine and Bello solution and also   secured this into the appropriate position using 2-0 PDS suture and then   replaced the implant using a Mo funnel and also secured over the GalaFLEX   implant with 2-0 PDS suture.  Please note prior to the closure, I ensured that   the GalaFLEX was completely deployed and flat and then minimized any evidence of   palpability.  Then also closed this incision with 0 Vicryl sutures in the   subcutaneous tissue and also another layer of the dermis.  At this point, I then   performed a tailor tack.   What I had marked her for was a Gruber pattern to help   remove her redundant skin for the mastopexy and I performed a tailor tack in the   upright position on the table on the right and left breasts to evaluate for   symmetry and improvement.  After I confirmed my markings, I then proceeded to   de-epithelialize the pattern that I had marked out.  After this was   accomplished, I then incised around the circumareolar portion of the pattern,   elevating a dermal ridge for later closure and then proceeded with closure.    Please note, we de-epithelialized the right and left side and elevated the   dermal ridge in both sides and then proceeded with closure.  The vertical and   horizontal IMF incision was closed with 0 Vicryl sutures in the deep dermis, 2-0   subcuticular Stratafix along the IMF incisions.  We used cv-3 [place]-Devendra around   the nipple-areolar complexes and secured this down to the size of 42 mm   template.  We then placed additional 3-0 Vicryl sutures in interrupted fashion   and inset the nipple-areolar complex and then we ran 3-0 subcuticular Stratafix   around the nipple-areolar complex and down the vertical incision Dermabond tape   was used as a dressing.  The patient was evaluated in upright supine position.    Sutures noted to be very good.  She tolerated the procedure well.  No   complications.  I did inject local anesthetic throughout the entire field at   closure.        ______________________________  MD YAZMIN Kapoor/AQS  DD:  08/10/2023  Time:  11:44AM  DT:  08/10/2023  Time:  01:50PM  Job #:  [id number]      OPERATIVE REPORT

## 2023-08-11 NOTE — DISCHARGE SUMMARY
[facility] Surgical - Periop Services  Discharge Note  Short Stay    Procedure(s) (LRB):  MASTOPEXY (CASH //  Bilateral mastopexy REVISION with placement of galaflex mesh) (Bilateral)      OUTCOME: Patient tolerated treatment/procedure well without complication and is now ready for discharge.    DISPOSITION: Home or Self Care    FINAL DIAGNOSIS:  <principal problem not specified>    FOLLOWUP: In clinic    DISCHARGE INSTRUCTIONS:  No discharge procedures on file.     TIME SPENT ON DISCHARGE: 5 minutes

## 2025-07-07 NOTE — PLAN OF CARE
Problem: Pain Acute  Goal: Acceptable Pain Control and Functional Ability  11/22/2022 1307 by Anika Angel RN  Outcome: Met  11/22/2022 1236 by Anika Angel RN  Outcome: Ongoing, Progressing     Problem: Fall Injury Risk  Goal: Absence of Fall and Fall-Related Injury  11/22/2022 1307 by Anika Angel RN  Outcome: Met  11/22/2022 1236 by Anika Angel RN  Outcome: Ongoing, Progressing     Problem: Infection  Goal: Absence of Infection Signs and Symptoms  11/22/2022 1307 by Anika Angel RN  Outcome: Met  11/22/2022 1236 by Anika Angel RN  Outcome: Ongoing, Progressing      No

## (undated) DEVICE — FLEXFIT ULTRA HIGH COVERAGE BRA

## (undated) DEVICE — GLOVE PROTEXIS BLUE LATEX 8

## (undated) DEVICE — SCALPEL #15 BLADE STRL DISP.

## (undated) DEVICE — COVER PROXIMA MAYO STAND

## (undated) DEVICE — SUPPORT ULNA NERVE PROTECTOR

## (undated) DEVICE — DRAPE UTILITY W/ TAPE 20X30IN

## (undated) DEVICE — DRAPE C-ARMOR EQUIPMENT COVER

## (undated) DEVICE — Device

## (undated) DEVICE — TOWEL OR DISP STRL BLUE 4/PK

## (undated) DEVICE — SOL NACL IRR 1000ML BTL

## (undated) DEVICE — DRAPE TOP 53X102IN

## (undated) DEVICE — GLOVE PROTEXIS PI SYN SURG 7

## (undated) DEVICE — BOWL UTILITY BLUE 32OZ

## (undated) DEVICE — SUT STRATAFIX MCRYL 27IN 2-0

## (undated) DEVICE — ELECTRODE PATIENT RETURN DISP

## (undated) DEVICE — GOWN X-LG STERILE BACK

## (undated) DEVICE — BOWL STERILE LARGE 32OZ

## (undated) DEVICE — ELECTRODE BLADE INSULATED 1 IN

## (undated) DEVICE — SPONGE LAP STRL 18X18IN

## (undated) DEVICE — BENZOIN TINCTURE 0.66ML

## (undated) DEVICE — GLOVE PROTEXIS LTX MICRO 8

## (undated) DEVICE — SYR IRRIGATION BULB STER 60ML

## (undated) DEVICE — SUT CTD VICRYL 0 UND BR

## (undated) DEVICE — BLANKET SNUGGLE WARM LOWER BDY

## (undated) DEVICE — DRAIN JACKSON-PRATT NO TRCR 7F

## (undated) DEVICE — FUNNEL KELLER STERILE

## (undated) DEVICE — APPLICATOR CHLORAPREP ORN 26ML

## (undated) DEVICE — DRESSING TELFA N ADH 3X8

## (undated) DEVICE — GLOVE PROTEXIS BLUE LATEX 7

## (undated) DEVICE — SOL NORMAL USPCA 0.9%

## (undated) DEVICE — INSTRUMENT FRAZIER 10FR W/VENT

## (undated) DEVICE — NDL SYR 10ML 18X1.5 LL BLUNT

## (undated) DEVICE — PILLOW HEAD REST

## (undated) DEVICE — STAPLER SKIN PROXIMATE WIDE

## (undated) DEVICE — SUT BONE WAX 2.5 GRMS 12/BX

## (undated) DEVICE — BLADE SURG STAINLESS STEEL #10

## (undated) DEVICE — PAD ABD 8X10 STERILE

## (undated) DEVICE — HEAD PRECISION MATCH LILAC 2.5

## (undated) DEVICE — SUT SILK 2.0 BLK 18

## (undated) DEVICE — SYS CLSR DERMABOND PRINEO 22CM

## (undated) DEVICE — GLOVE PROTEXIS LTX MICRO 6.5

## (undated) DEVICE — DRAPE EQUIP BTTN WALTERLORENZ

## (undated) DEVICE — DRAPE ORTH SPLIT 77X108IN

## (undated) DEVICE — NDL SAFETY 22G X 1.5 ECLIPSE

## (undated) DEVICE — NDL HYPO 22GX1 1/2 SYR 10ML LL

## (undated) DEVICE — SEE MEDLINE ITEM 157196

## (undated) DEVICE — SUT MCRYL PLUS 3-0 PS2 27IN

## (undated) DEVICE — BANDAGE KERLIX AMD

## (undated) DEVICE — SUT CTD VICRYL 3-0 CR/SH

## (undated) DEVICE — CLOSURE SKIN STERI STRIP 1/2X4

## (undated) DEVICE — SUT STRATAFIX 3-0 30CM

## (undated) DEVICE — SEE MEDLINE ITEM 159592

## (undated) DEVICE — DRAPE SURG W/TWL 17 5/8X23

## (undated) DEVICE — COVER C-ARM STRAP BAND 44X80IN

## (undated) DEVICE — SUT GORETEX CV-2 THX-36 36IN

## (undated) DEVICE — PAD OVERLAY MATTRESS 32X73X3IN

## (undated) DEVICE — DRAPE MEDIUM SHEET 40X70IN

## (undated) DEVICE — SUT VICRYL+ 27 UR-6 VIOL

## (undated) DEVICE — BLADE SURG STAINLESS STEEL #15

## (undated) DEVICE — WRAP SELF ADH. COBAN 4X5YD

## (undated) DEVICE — SHEET AVIENE MICFIB 1.4X1.4IN

## (undated) DEVICE — RESERVOIR JACKSON-PRATT 100CC

## (undated) DEVICE — DRESSING TELFA N ADH 3X8IN

## (undated) DEVICE — BUR BONE CUT MICRO TPS 3X3.8MM

## (undated) DEVICE — GLOVE PROTEXIS PI SYN SURG 6.5

## (undated) DEVICE — TUBE PAL ASPIR CONN STRL 12FT

## (undated) DEVICE — EVACUATOR PENCIL SMOKE NEPTUNE

## (undated) DEVICE — GLOVE PROTEXIS LTX MICRO  7.5

## (undated) DEVICE — DISH PETRI MED 3.5IN

## (undated) DEVICE — ELECTRODE BLD EXT 6.50 ST DISP

## (undated) DEVICE — KIT SURGIFLO HEMOSTATIC MATRIX

## (undated) DEVICE — SURGICAL BRA

## (undated) DEVICE — RETRACTOR ONETRAC SNGL 90INX22

## (undated) DEVICE — TUBING SILICON CLR 3/16IN 10FT

## (undated) DEVICE — SUT VICRYL PLUS 3-0 X-1 18IN

## (undated) DEVICE — DRAPE OPMI STERILE

## (undated) DEVICE — GOWN SMARTSLEEVE AAMI LVL4 LG

## (undated) DEVICE — KIT POS JACKSON TABLE NO HDRST

## (undated) DEVICE — PENCIL SMOKE EVAC ROCKER 70MM

## (undated) DEVICE — SUT VICRYL 4-0 18 P-3